# Patient Record
Sex: FEMALE | Race: WHITE | NOT HISPANIC OR LATINO | Employment: FULL TIME | URBAN - METROPOLITAN AREA
[De-identification: names, ages, dates, MRNs, and addresses within clinical notes are randomized per-mention and may not be internally consistent; named-entity substitution may affect disease eponyms.]

---
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
## 2021-01-27 ENCOUNTER — AMBULATORY - HEALTHEAST (OUTPATIENT)
Dept: NURSING | Facility: CLINIC | Age: 21
End: 2021-01-27

## 2021-02-07 ENCOUNTER — HEALTH MAINTENANCE LETTER (OUTPATIENT)
Age: 21
End: 2021-02-07

## 2021-02-17 ENCOUNTER — AMBULATORY - HEALTHEAST (OUTPATIENT)
Dept: NURSING | Facility: CLINIC | Age: 21
End: 2021-02-17

## 2021-04-11 ENCOUNTER — HOSPITAL ENCOUNTER (EMERGENCY)
Facility: CLINIC | Age: 21
Discharge: HOME | End: 2021-04-11
Attending: NURSE PRACTITIONER | Admitting: NURSE PRACTITIONER
Payer: COMMERCIAL

## 2021-04-11 ENCOUNTER — APPOINTMENT (OUTPATIENT)
Dept: ULTRASOUND IMAGING | Facility: CLINIC | Age: 21
End: 2021-04-11
Attending: NURSE PRACTITIONER
Payer: COMMERCIAL

## 2021-04-11 VITALS
SYSTOLIC BLOOD PRESSURE: 118 MMHG | HEART RATE: 91 BPM | OXYGEN SATURATION: 100 % | RESPIRATION RATE: 18 BRPM | WEIGHT: 115 LBS | BODY MASS INDEX: 20.38 KG/M2 | DIASTOLIC BLOOD PRESSURE: 76 MMHG | TEMPERATURE: 97.9 F | HEIGHT: 63 IN

## 2021-04-11 DIAGNOSIS — R78.81 BACTEREMIA: ICD-10-CM

## 2021-04-11 DIAGNOSIS — R10.2 PELVIC PAIN IN FEMALE: ICD-10-CM

## 2021-04-11 DIAGNOSIS — R35.0 URINARY FREQUENCY: ICD-10-CM

## 2021-04-11 LAB
ALBUMIN UR-MCNC: NEGATIVE MG/DL
AMORPH CRY #/AREA URNS HPF: ABNORMAL /HPF
ANION GAP SERPL CALCULATED.3IONS-SCNC: 5 MMOL/L (ref 3–14)
APPEARANCE UR: ABNORMAL
B-HCG SERPL-ACNC: ABNORMAL IU/L (ref 0–5)
BACTERIA #/AREA URNS HPF: ABNORMAL /HPF
BASOPHILS # BLD AUTO: 0.1 10E9/L (ref 0–0.2)
BASOPHILS NFR BLD AUTO: 0.5 %
BILIRUB UR QL STRIP: NEGATIVE
BUN SERPL-MCNC: 9 MG/DL (ref 7–30)
CALCIUM SERPL-MCNC: 8.8 MG/DL (ref 8.5–10.1)
CHLORIDE SERPL-SCNC: 104 MMOL/L (ref 94–109)
CO2 SERPL-SCNC: 27 MMOL/L (ref 20–32)
COLOR UR AUTO: YELLOW
CREAT SERPL-MCNC: 0.74 MG/DL (ref 0.52–1.04)
DIFFERENTIAL METHOD BLD: ABNORMAL
EOSINOPHIL # BLD AUTO: 0.2 10E9/L (ref 0–0.7)
EOSINOPHIL NFR BLD AUTO: 1.4 %
ERYTHROCYTE [DISTWIDTH] IN BLOOD BY AUTOMATED COUNT: 13.1 % (ref 10–15)
GFR SERPL CREATININE-BSD FRML MDRD: >90 ML/MIN/{1.73_M2}
GLUCOSE SERPL-MCNC: 98 MG/DL (ref 70–99)
GLUCOSE UR STRIP-MCNC: NEGATIVE MG/DL
HCT VFR BLD AUTO: 39.8 % (ref 35–47)
HGB BLD-MCNC: 13.5 G/DL (ref 11.7–15.7)
HGB UR QL STRIP: NEGATIVE
IMM GRANULOCYTES # BLD: 0 10E9/L (ref 0–0.4)
IMM GRANULOCYTES NFR BLD: 0.2 %
KETONES UR STRIP-MCNC: NEGATIVE MG/DL
LEUKOCYTE ESTERASE UR QL STRIP: ABNORMAL
LYMPHOCYTES # BLD AUTO: 2.8 10E9/L (ref 0.8–5.3)
LYMPHOCYTES NFR BLD AUTO: 21.1 %
MCH RBC QN AUTO: 29.7 PG (ref 26.5–33)
MCHC RBC AUTO-ENTMCNC: 33.9 G/DL (ref 31.5–36.5)
MCV RBC AUTO: 88 FL (ref 78–100)
MONOCYTES # BLD AUTO: 0.9 10E9/L (ref 0–1.3)
MONOCYTES NFR BLD AUTO: 6.6 %
NEUTROPHILS # BLD AUTO: 9.3 10E9/L (ref 1.6–8.3)
NEUTROPHILS NFR BLD AUTO: 70.2 %
NITRATE UR QL: NEGATIVE
NRBC # BLD AUTO: 0 10*3/UL
NRBC BLD AUTO-RTO: 0 /100
PH UR STRIP: 8 PH (ref 5–7)
PLATELET # BLD AUTO: 239 10E9/L (ref 150–450)
POTASSIUM SERPL-SCNC: 3.4 MMOL/L (ref 3.4–5.3)
RBC # BLD AUTO: 4.54 10E12/L (ref 3.8–5.2)
RBC #/AREA URNS AUTO: <1 /HPF (ref 0–2)
SODIUM SERPL-SCNC: 136 MMOL/L (ref 133–144)
SOURCE: ABNORMAL
SP GR UR STRIP: 1.01 (ref 1–1.03)
SQUAMOUS #/AREA URNS AUTO: 1 /HPF (ref 0–1)
UROBILINOGEN UR STRIP-MCNC: 0 MG/DL (ref 0–2)
WBC # BLD AUTO: 13.2 10E9/L (ref 4–11)
WBC #/AREA URNS AUTO: 8 /HPF (ref 0–5)

## 2021-04-11 PROCEDURE — 85025 COMPLETE CBC W/AUTO DIFF WBC: CPT | Performed by: NURSE PRACTITIONER

## 2021-04-11 PROCEDURE — 80048 BASIC METABOLIC PNL TOTAL CA: CPT | Performed by: NURSE PRACTITIONER

## 2021-04-11 PROCEDURE — 76801 OB US < 14 WKS SINGLE FETUS: CPT

## 2021-04-11 PROCEDURE — 87086 URINE CULTURE/COLONY COUNT: CPT | Performed by: NURSE PRACTITIONER

## 2021-04-11 PROCEDURE — 84702 CHORIONIC GONADOTROPIN TEST: CPT | Performed by: NURSE PRACTITIONER

## 2021-04-11 PROCEDURE — 99284 EMERGENCY DEPT VISIT MOD MDM: CPT | Performed by: NURSE PRACTITIONER

## 2021-04-11 PROCEDURE — 99284 EMERGENCY DEPT VISIT MOD MDM: CPT | Mod: 25 | Performed by: NURSE PRACTITIONER

## 2021-04-11 PROCEDURE — 81001 URINALYSIS AUTO W/SCOPE: CPT | Performed by: NURSE PRACTITIONER

## 2021-04-11 ASSESSMENT — ENCOUNTER SYMPTOMS
NUMBNESS: 0
NAUSEA: 0
FATIGUE: 0
ARTHRALGIAS: 0
LIGHT-HEADEDNESS: 0
CHILLS: 0
ABDOMINAL PAIN: 0
VOMITING: 0
FLANK PAIN: 0
JOINT SWELLING: 0
COUGH: 0
DYSURIA: 0
HEMATURIA: 0
SHORTNESS OF BREATH: 0
DIZZINESS: 0
MYALGIAS: 0
HEADACHES: 0
WEAKNESS: 0
FREQUENCY: 1
DIFFICULTY URINATING: 0
FEVER: 0

## 2021-04-11 ASSESSMENT — MIFFLIN-ST. JEOR: SCORE: 1255.77

## 2021-04-12 NOTE — ED PROVIDER NOTES
History     Chief Complaint   Patient presents with     Pelvic Pain     pelvic pain, onset last evening. aching, stabbing. 8/10. pain constant. unsure of how many weeks pregant, 1st pregnancy. no vaginal discharge or drainage.      RAGHAV Mckeon is a 20 year old female who presents to the emergency department for evaluation of pelvic pain. Patient timeline includes right lower pelvic pain for two days on 3/21. Positive pregnancy test prior to a procedure on 3/30. Yesterday patient developed a sharp, pinching and aching pain to the right pelvis that worsened today. Accompanying urinary frequency.  LMP 3/8/21. Normal BM today. No OB/GYN visit yet. No fever, abdominal pain, nausea, vomiting, diarrhea, vaginal bleeding, change in vaginal discharge, hematuria and dysuria.     Allergies:  Allergies   Allergen Reactions     Keflex [Cephalexin]        Problem List:    There are no active problems to display for this patient.       Past Medical History:    Past Medical History:   Diagnosis Date     Anxiety        Past Surgical History:    No past surgical history on file.    Family History:    No family history on file.    Social History:  Marital Status:  Single [1]  Social History     Tobacco Use     Smoking status: Not on file   Substance Use Topics     Alcohol use: No     Drug use: No        Medications:    Escitalopram Oxalate (LEXAPRO PO)  NONFORMULARY          Review of Systems   Constitutional: Negative for chills, fatigue and fever.   Respiratory: Negative for cough and shortness of breath.    Cardiovascular: Negative for chest pain.   Gastrointestinal: Negative for abdominal pain, nausea and vomiting.   Genitourinary: Positive for frequency and pelvic pain. Negative for difficulty urinating, dysuria, flank pain, hematuria, vaginal bleeding, vaginal discharge and vaginal pain.   Musculoskeletal: Negative for arthralgias, joint swelling and myalgias.   Skin: Negative for rash.   Neurological: Negative  "for dizziness, weakness, light-headedness, numbness and headaches.   All other systems reviewed and are negative.      Physical Exam   BP: 118/76  Pulse: 91  Temp: 97.9  F (36.6  C)  Resp: 18  Height: 160 cm (5' 3\")  Weight: 52.2 kg (115 lb)  SpO2: 100 %      Physical Exam  Constitutional:       General: She is not in acute distress.     Appearance: She is well-developed. She is not diaphoretic.   HENT:      Head: Normocephalic.   Eyes:      Conjunctiva/sclera: Conjunctivae normal.      Pupils: Pupils are equal, round, and reactive to light.   Neck:      Musculoskeletal: Normal range of motion and neck supple.   Cardiovascular:      Rate and Rhythm: Normal rate and regular rhythm.      Pulses: Normal pulses.   Pulmonary:      Effort: Pulmonary effort is normal. No respiratory distress.      Breath sounds: Normal breath sounds and air entry. No decreased air movement. No decreased breath sounds, wheezing or rhonchi.   Abdominal:      General: There is no distension.      Palpations: Abdomen is soft.      Tenderness: There is abdominal tenderness in the suprapubic area. There is no right CVA tenderness, left CVA tenderness, guarding or rebound. Negative signs include Slater's sign and McBurney's sign.   Musculoskeletal: Normal range of motion.   Skin:     General: Skin is warm.      Capillary Refill: Capillary refill takes less than 2 seconds.   Neurological:      General: No focal deficit present.      Mental Status: She is alert and oriented to person, place, and time.   Psychiatric:         Mood and Affect: Mood normal.       ED Course        Procedures        Results for orders placed or performed during the hospital encounter of 04/11/21 (from the past 24 hour(s))   UA with Microscopic reflex to Culture    Specimen: Midstream Urine   Result Value Ref Range    Color Urine Yellow     Appearance Urine Slightly Cloudy     Glucose Urine Negative NEG^Negative mg/dL    Bilirubin Urine Negative NEG^Negative    Ketones " Urine Negative NEG^Negative mg/dL    Specific Gravity Urine 1.010 1.003 - 1.035    Blood Urine Negative NEG^Negative    pH Urine 8.0 (H) 5.0 - 7.0 pH    Protein Albumin Urine Negative NEG^Negative mg/dL    Urobilinogen mg/dL 0.0 0.0 - 2.0 mg/dL    Nitrite Urine Negative NEG^Negative    Leukocyte Esterase Urine Moderate (A) NEG^Negative    Source Midstream Urine     WBC Urine 8 (H) 0 - 5 /HPF    RBC Urine <1 0 - 2 /HPF    Bacteria Urine Few (A) NEG^Negative /HPF    Squamous Epithelial /HPF Urine 1 0 - 1 /HPF    Amorphous Crystals Few (A) NEG^Negative /HPF   HCG quantitative pregnancy (blood)   Result Value Ref Range    HCG Quantitative Serum 48,439 (H) 0 - 5 IU/L   CBC with platelets, differential   Result Value Ref Range    WBC 13.2 (H) 4.0 - 11.0 10e9/L    RBC Count 4.54 3.8 - 5.2 10e12/L    Hemoglobin 13.5 11.7 - 15.7 g/dL    Hematocrit 39.8 35.0 - 47.0 %    MCV 88 78 - 100 fl    MCH 29.7 26.5 - 33.0 pg    MCHC 33.9 31.5 - 36.5 g/dL    RDW 13.1 10.0 - 15.0 %    Platelet Count 239 150 - 450 10e9/L    Diff Method Automated Method     % Neutrophils 70.2 %    % Lymphocytes 21.1 %    % Monocytes 6.6 %    % Eosinophils 1.4 %    % Basophils 0.5 %    % Immature Granulocytes 0.2 %    Nucleated RBCs 0 0 /100    Absolute Neutrophil 9.3 (H) 1.6 - 8.3 10e9/L    Absolute Lymphocytes 2.8 0.8 - 5.3 10e9/L    Absolute Monocytes 0.9 0.0 - 1.3 10e9/L    Absolute Eosinophils 0.2 0.0 - 0.7 10e9/L    Absolute Basophils 0.1 0.0 - 0.2 10e9/L    Abs Immature Granulocytes 0.0 0 - 0.4 10e9/L    Absolute Nucleated RBC 0.0    Basic metabolic panel   Result Value Ref Range    Sodium 136 133 - 144 mmol/L    Potassium 3.4 3.4 - 5.3 mmol/L    Chloride 104 94 - 109 mmol/L    Carbon Dioxide 27 20 - 32 mmol/L    Anion Gap 5 3 - 14 mmol/L    Glucose 98 70 - 99 mg/dL    Urea Nitrogen 9 7 - 30 mg/dL    Creatinine 0.74 0.52 - 1.04 mg/dL    GFR Estimate >90 >60 mL/min/[1.73_m2]    GFR Estimate If Black >90 >60 mL/min/[1.73_m2]    Calcium 8.8 8.5 - 10.1  mg/dL   OB < 14 weeks single or first gestation US    Narrative    EXAM: US OB < 14 WEEKS SINGLE  LOCATION: [facility]  DATE/TIME: 4/11/2021 9:44 PM    INDICATION: Pregnant with cramping.  COMPARISON: None.  TECHNIQUE: Transabdominal scans were performed.    FINDINGS:  UTERUS: Single normal appearing intrauterine gestation sac.  CRL: Measures 0.6 cm, equals 6 weeks 3 days.  FETAL HEART RATE: 133 bpm.  AMNIOTIC FLUID: Normal.  PLACENTA: Not yet formed. No evidence for sub-chorionic hemorrhage.    RIGHT OVARY: 2.1 cm corpus luteum cyst.  LEFT OVARY: Normal.      Impression    IMPRESSION:   1.  Single living intrauterine gestation at 6 weeks 3 days, EDC 12/2/2021.  2.  No cause of cramping is evident.           Medications - No data to display    Assessments & Plan (with Medical Decision Making)   Paula Mckeon is a 20 year old female who presents to the emergency department for evaluation of pelvic pain. Patient timeline includes right lower pelvic pain for two days on 3/21. Positive pregnancy test prior to a procedure on 3/30. Yesterday patient developed a sharp, pinching and aching pain to the right pelvis that worsened today. Accompanying urinary frequency. Vital signs normal. Physical exam as above. BMP normal. WBC 13.2, ANC 9.3. Urinalysis with moderate LE, 8 WBC and few bacteria. Discussed results with patient, she would like to await culture results prior to treatment. Urine culture pending. Current Hcg quant 48,439. US with IUP of 6 weeks, 3 days gestation and no evidence of sub-chorionic hemorrhage. Updated patient on reassuring findings. Stressed importance of prenatal vitamins and establishing care with OB/GYN. Return to the department with new or worsening symptoms. She is agreeable to plan of care and comfortable with discharge. Discharged in good condition.     I have reviewed the nursing notes.    I have reviewed the findings, diagnosis, plan and need for follow up with the  patient.  Discharge Medication List as of 4/11/2021 11:01 PM        Final diagnoses:   Bacteremia   Urinary frequency   Pelvic pain in female     4/11/2021   [facility] EMERGENCY DEPT     Alisa Morton, APRN CNP  04/11/21 7009

## 2021-04-12 NOTE — DISCHARGE INSTRUCTIONS
Call OB/GYN to establish care tomorrow. Your estimated due date is 12/02/21. Begin prenatal vitamin with [facility] tomorrow.

## 2021-04-12 NOTE — RESULT ENCOUNTER NOTE
[facility] Emergency Dept discharge antibiotic (if prescribed): None  No changes in treatment per [facility] ED Lab Result Urine culture protocol.

## 2021-04-13 LAB
BACTERIA SPEC CULT: NORMAL
Lab: NORMAL
SPECIMEN SOURCE: NORMAL

## 2021-04-13 NOTE — RESULT ENCOUNTER NOTE
Final urine culture report is negative.  Adult Negative Urine culture parameters: Any # Urogenital single or mixed organism, <10,000 col/ml single organism (cath specimen), and <50,000 col/ml single organism (midstream or cath specimen).  Treatment recommendations per [facility] ED Lab Result Urine Culture protocol.

## 2021-09-18 ENCOUNTER — HEALTH MAINTENANCE LETTER (OUTPATIENT)
Age: 21
End: 2021-09-18

## 2022-01-23 ENCOUNTER — OFFICE VISIT (OUTPATIENT)
Dept: URGENT CARE | Facility: URGENT CARE | Age: 22
End: 2022-01-23
Payer: COMMERCIAL

## 2022-01-23 VITALS
HEART RATE: 107 BPM | WEIGHT: 111 LBS | BODY MASS INDEX: 19.66 KG/M2 | SYSTOLIC BLOOD PRESSURE: 128 MMHG | DIASTOLIC BLOOD PRESSURE: 80 MMHG | RESPIRATION RATE: 16 BRPM | OXYGEN SATURATION: 100 % | TEMPERATURE: 99.3 F

## 2022-01-23 DIAGNOSIS — J02.8 ACUTE PHARYNGITIS DUE TO OTHER SPECIFIED ORGANISMS: ICD-10-CM

## 2022-01-23 DIAGNOSIS — R07.0 THROAT PAIN: Primary | ICD-10-CM

## 2022-01-23 DIAGNOSIS — R09.81 NASAL CONGESTION: ICD-10-CM

## 2022-01-23 LAB
DEPRECATED S PYO AG THROAT QL EIA: NEGATIVE
GROUP A STREP BY PCR: NOT DETECTED

## 2022-01-23 PROCEDURE — 87651 STREP A DNA AMP PROBE: CPT | Performed by: FAMILY MEDICINE

## 2022-01-23 PROCEDURE — 99000 SPECIMEN HANDLING OFFICE-LAB: CPT | Performed by: FAMILY MEDICINE

## 2022-01-23 PROCEDURE — 99203 OFFICE O/P NEW LOW 30 MIN: CPT | Performed by: FAMILY MEDICINE

## 2022-01-23 PROCEDURE — U0003 INFECTIOUS AGENT DETECTION BY NUCLEIC ACID (DNA OR RNA); SEVERE ACUTE RESPIRATORY SYNDROME CORONAVIRUS 2 (SARS-COV-2) (CORONAVIRUS DISEASE [COVID-19]), AMPLIFIED PROBE TECHNIQUE, MAKING USE OF HIGH THROUGHPUT TECHNOLOGIES AS DESCRIBED BY CMS-2020-01-R: HCPCS | Mod: 90 | Performed by: FAMILY MEDICINE

## 2022-01-23 PROCEDURE — U0005 INFEC AGEN DETEC AMPLI PROBE: HCPCS | Mod: 90 | Performed by: FAMILY MEDICINE

## 2022-01-23 RX ORDER — DEXAMETHASONE 4 MG/1
8 TABLET ORAL
Qty: 6 TABLET | Refills: 0 | Status: SHIPPED | OUTPATIENT
Start: 2022-01-23 | End: 2022-01-26

## 2022-01-23 NOTE — PROGRESS NOTES
SUBJECTIVE:   Paula Mckeon is a 21 year old female presenting with a chief complaint of   Chief Complaint   Patient presents with     Pharyngitis     1/21/22 sore throat white patches     Sinus Problem     sinus pressure   .    Onset of symptoms was 2 day(s) ago.  Course of illness is waxing and waning.    Severity moderate  Current and Associated symptoms: stuffy nose, ear pain bilateral mild, sore throat and fatigue  Treatment measures tried include Tylenol/Ibuprofen, Fluids and Rest.  Predisposing factors include .      ROS      Past Medical History:   Diagnosis Date     Anxiety      Current Outpatient Medications   Medication Sig Dispense Refill     NONFORMULARY Birth control       Escitalopram Oxalate (LEXAPRO PO)  (Patient not taking: Reported on 1/23/2022)       Social History     Tobacco Use     Smoking status: Never Smoker     Smokeless tobacco: Never Used   Substance Use Topics     Alcohol use: No       OBJECTIVE  /80   Pulse 107   Temp 99.3  F (37.4  C) (Tympanic)   Resp 16   Wt 50.3 kg (111 lb)   LMP 03/08/2021   SpO2 100%   BMI 19.66 kg/m      Physical Exam    Labs:  No results found for this or any previous visit (from the past 24 hour(s)).    X-Ray was not done.    ASSESSMENT:    1. Throat pain    - Streptococcus A Rapid Screen w/Reflex to PCR - Clinic Collect  - Group A Streptococcus PCR Throat Swab    2. Nasal congestion    - Symptomatic; Yes; 1/21/2022 COVID-19 Virus (Coronavirus) by PCR Nose    3. Acute pharyngitis due to other specified organisms  If your strep is positive do not take the steroid  - dexamethasone (DECADRON) 4 MG tablet; Take 2 tablets (8 mg) by mouth daily (with breakfast) for 3 days  Dispense: 6 tablet; Refill: 0       Medical Decision Making:        Serious Comorbid Conditions:  None    PLAN:  Tylenol    Followup:    In 1 week(s) follow up with If not improving or if condition worsens, follow up with your Primary Care Provider    Rosaura Farrar M.D.

## 2022-01-24 LAB — SARS-COV-2 RNA RESP QL NAA+PROBE: DETECTED

## 2022-01-25 ENCOUNTER — VIRTUAL VISIT (OUTPATIENT)
Dept: FAMILY MEDICINE | Facility: CLINIC | Age: 22
End: 2022-01-25
Payer: COMMERCIAL

## 2022-01-25 ENCOUNTER — TELEPHONE (OUTPATIENT)
Dept: URGENT CARE | Facility: URGENT CARE | Age: 22
End: 2022-01-25

## 2022-01-25 DIAGNOSIS — U07.1 INFECTION DUE TO 2019 NOVEL CORONAVIRUS: Primary | ICD-10-CM

## 2022-01-25 PROCEDURE — 99213 OFFICE O/P EST LOW 20 MIN: CPT | Mod: GT | Performed by: PHYSICIAN ASSISTANT

## 2022-01-25 RX ORDER — CODEINE PHOSPHATE AND GUAIFENESIN 10; 100 MG/5ML; MG/5ML
1-2 SOLUTION ORAL EVERY 4 HOURS PRN
Qty: 118 ML | Refills: 0 | Status: SHIPPED | OUTPATIENT
Start: 2022-01-25

## 2022-01-25 RX ORDER — NORETHINDRONE ACETATE AND ETHINYL ESTRADIOL .02; 1 MG/1; MG/1
TABLET ORAL
COMMUNITY
Start: 2021-12-06

## 2022-01-25 RX ORDER — LEVALBUTEROL TARTRATE 45 UG/1
1-2 AEROSOL, METERED ORAL
COMMUNITY
Start: 2021-02-18 | End: 2022-01-25

## 2022-01-25 RX ORDER — LEVALBUTEROL TARTRATE 45 UG/1
1-2 AEROSOL, METERED ORAL EVERY 4 HOURS PRN
Qty: 15 G | Refills: 0 | Status: SHIPPED | OUTPATIENT
Start: 2022-01-25

## 2022-01-25 RX ORDER — LIDOCAINE HYDROCHLORIDE 20 MG/ML
15 SOLUTION OROPHARYNGEAL
COMMUNITY
Start: 2022-01-24

## 2022-01-25 RX ORDER — BENZONATATE 200 MG/1
200 CAPSULE ORAL 3 TIMES DAILY PRN
Qty: 30 CAPSULE | Refills: 0 | Status: SHIPPED | OUTPATIENT
Start: 2022-01-25

## 2022-01-25 NOTE — PROGRESS NOTES
Paula is a 21 year old who is being evaluated via a billable video visit.      How would you like to obtain your AVS? MyChart  If the video visit is dropped, the invitation should be resent by: Other e-mail: my chart   Will anyone else be joining your video visit? No    Video Start Time: 2:30 PM    Assessment & Plan   Infection due to 2019 novel coronavirus  Covid positive. Symptoms are mild-moderate. No new fevers, worsening shortness of breathing, chest pain. No red flag signs or symptoms. Rx for Xopenex, Tessalon and Robitussin AC. RTC prn for any new, changing or worsening symptoms.    - levalbuterol (XOPENEX HFA) 45 MCG/ACT inhaler; Inhale 1-2 puffs into the lungs every 4 hours as needed for shortness of breath / dyspnea or wheezing  - benzonatate (TESSALON) 200 MG capsule; Take 1 capsule (200 mg) by mouth 3 times daily as needed for cough  - guaiFENesin-codeine (ROBITUSSIN AC) 100-10 MG/5ML solution; Take 5-10 mLs by mouth every 4 hours as needed for cough    Return in about 1 week (around 2/1/2022), or if symptoms worsen or fail to improve, for Video Visit.    Duran Rizo PA-C  [facility]   Paula is a 21 year old who presents for the following health issues     HPI   Covid   -Positive test 01/23/2022  -Was given magic mouth wash for her sore throat.   -Urgent care provider gave her and told her to start dexamethasone if strep was negative so she started that this morning.     Main Concerns   -What else she can do for treatment   -Shortness of breath. Says that she can not walk from her bedroom to her kitchen with out being short of breath. Says that even talking makes her short of breath.       Review of Systems   See HPI       Objective           Vitals:  No vitals were obtained today due to virtual visit.    Physical Exam   GENERAL: Healthy, alert and no distress  EYES: Eyes grossly normal to inspection.  No discharge or erythema, or obvious  scleral/conjunctival abnormalities.  RESP: No audible wheeze, cough, or visible cyanosis.  No visible retractions or increased work of breathing.    SKIN: Visible skin clear. No significant rash, abnormal pigmentation or lesions.  NEURO: Cranial nerves grossly intact.  Mentation and speech appropriate for age.  PSYCH: Mentation appears normal, affect normal/bright, judgement and insight intact, normal speech and appearance well-groomed.          Video-Visit Details    Type of service:  Video Visit    Video End Time:2:45 PM    Originating Location (pt. Location): Home    Distant Location (provider location):  [facility]     Platform used for Video Visit: [employer]

## 2022-01-25 NOTE — TELEPHONE ENCOUNTER
"Coronavirus (COVID-19) Notification    Very pleasant to speak with. Pt states she gets these asthma like flare ups when she comes down w/ sickness, has had it since she was 7. 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 Knows tripp will have notice for work if needed. SOB was addressed as not a concern at this time, she is currently checking her o2 stats at home ranging around 94%. \"Because I have knowledge in healthcare, I am trying what I can to prevent going into the ED.\" She did not have any further questions.     Caller Name (Patient, parent, daughter/son, grandparent, etc)  Pt    Reason for call  Notify of Positive Coronavirus (COVID-19) lab results, assess symptoms,  review [facility] recommendations    Lab Result    Lab test:  2019-nCoV rRt-PCR or SARS-CoV-2 PCR    Oropharyngeal AND/OR nasopharyngeal swabs is POSITIVE for 2019-nCoV RNA/SARS-COV-2 PCR (COVID-19 virus)    RN Recommendations/Instructions per [facility] Coronavirus COVID-19 recommendations    Brief introduction script  Introduce self then review script:  \"I am calling on behalf of [employer].  We were notified that your Coronavirus test (COVID-19) for was POSITIVE for the virus.  I have some information to relay to you but first I wanted to mention that the [place] Dept of Health will be contacting you shortly [it's possible [facility] already called Patient] to talk to you more about how you are feeling and other people you have had contact with who might now also have the virus.  Also,  [employer] is Partnering with the [facility] for Covid-19 research, you may be contacted directly by research staff.\"      Assessment (Inquire about Patient's current symptoms)   Assessment   Current Symptoms at time of phone call: (if no symptoms, document No symptoms] SOB during activies, sore throat, coughing, congestion, body aches, GI related sx   Symptoms onset (if " applicable) 01/21/22     If at time of call, Patients symptoms hare worsened, the Patient should contact 911 or have someone drive them to Emergency Dept promptly:      If Patient calling 911, inform 911 personal that you have tested positive for the Coronavirus (COVID-19).  Place mask on and await 911 to arrive.    If Emergency Dept, If possible, please have another adult drive you to the Emergency Dept but you need to wear mask when in contact with other people.          Treatment Options:   Patient classified as COVID treatment eligible by Epic high risk algorithm: Yes  Is the patient symptomatic at the time of result notification? Yes. Was the onset of symptoms within the last 5 days? Yes.   Inform patient they may be eligible for a therapeutic treatment option that may reduce complications and hospitalization risk related to COVID19. These options would be discussed during a virtual visit with a provider.   Does the patient agree to have a visit with a provider to discuss treatment options? Yes. Is the patient seen at [facility]?  No: Warm transfer caller to [phone] to be scheduled with a virtual urgent provider.  During transfer, instruct  on appropriate time frame for visit (within 5 days of symptoms onset). If patient is on day 5 and unable to be scheduled, scheduling team will provide [facility] website.     Review information with Patient    Your result was positive. This means you have COVID-19 (coronavirus).  We have sent you a letter that reviews the information that I'll be reviewing with you now.    How can I protect others?    If you have symptoms: stay home and away from others (self-isolate) until:    You've had no fever--and no medicine that reduces fever--for 1 full day (24 hours). And       Your other symptoms have gotten better. For example, your cough or breathing has improved. And     At least 10 days have passed since your symptoms started. (If you've been told by a  doctor that you have a weak immune system, wait 20 days.)     If you don't have symptoms: Stay home and away from others (self-isolate) until at least 10 days have passed since your first positive COVID-19 test. (Date test collected)    During this time:    Stay in your own room, including for meals. Use your own bathroom if you can.    Stay away from others in your home. No hugging, kissing or shaking hands. No visitors.     Don't go to work, school or anywhere else.     Clean  high touch  surfaces often (doorknobs, counters, handles, etc.). Use a household cleaning spray or wipes. You'll find a full list on the EPA website at www.epa.gov/pesticide-registration/list-n-disinfectants-use-against-sars-cov-2.     Cover your mouth and nose with a mask, tissue or other face covering to avoid spreading germs.    Wash your hands and face often with soap and water.    Make a list of people you have been in close contact with recently, even if either of you wore a face covering.   - Start your list from 2 days before you became ill or had a positive test.  - Include anyone that was within 6 feet of you for a cumulative total of 15 minutes or more in 24 hours. (Example: if you sat next to Jeff for 5 minutes in the morning and 10 minutes in the afternoon, then you were in close contact for 15 minutes total that day. Jeff would be added to your list.)    A public health worker will call or text you. It is important that you answer. They will ask you questions about possible exposures to COVID-19, such as people you have been in direct contact with and places you have visited.    Tell the people on your list that you have COVID-19; they should stay away from others for 14 days starting from the last time they were in contact with you (unless you are told something different from a public health worker).     Caregivers in these groups are at risk for severe illness due to COVID-19:  o People 65 years and older  o People who live  in a nursing home or long-term care facility  o People with chronic disease (lung, heart, cancer, diabetes, kidney, liver, immunologic)  o People who have a weakened immune system, including those who:  - Are in cancer treatment  - Take medicine that weakens the immune system, such as corticosteroids  - Had a bone marrow or organ transplant  - Have an immune deficiency  - Have poorly controlled HIV or AIDS  - Are obese (body mass index of 40 or higher)  - Smoke regularly    Caregivers should wear gloves while washing dishes, handling laundry and cleaning bedrooms and bathrooms.    Wash and dry laundry with special caution. Don't shake dirty laundry, and use the warmest water setting you can.    If you have a weakened immune system, ask your doctor about other actions you should take.    For more tips, go to www.cdc.gov/coronavirus/2019-ncov/downloads/10Things.pdf.    You should not go back to work until you meet the guidelines above for ending your home isolation. You don't need to be retested for COVID-19 before going back to work--studies show that you won't spread the virus if it's been at least 10 days since your symptoms started (or 20 days, if you have a weak immune system).    Employers: This document serves as formal notice of your employee's medical guidelines for going back to work. They must meet the above guidelines before going back to work in person.    How can I take care of myself?    1. Get lots of rest. Drink extra fluids (unless a doctor has told you not to).    2. Take Tylenol (acetaminophen) for fever or pain. If you have liver or kidney problems, ask your family doctor if it's okay to take Tylenol.     Take either:     650 mg (two 325 mg pills) every 4 to 6 hours, or     1,000 mg (two 500 mg pills) every 8 hours as needed.     Note: Don't take more than 3,000 mg in one day. Acetaminophen is found in many medicines (both prescribed and over-the-counter medicines). Read all labels to be sure you  don't take too much.    For children, check the Tylenol bottle for the right dose (based on their age or weight).    3. If you have other health problems (like cancer, heart failure, an organ transplant or severe kidney disease): Call your specialty clinic if you don't feel better in the next 2 days.    4. Know when to call 911: Emergency warning signs include:    Trouble breathing or shortness of breath    Pain or pressure in the chest that doesn't go away    Feeling confused like you haven't felt before, or not being able to wake up    Bluish-colored lips or face    5. Sign up for [employer]. We know it's scary to hear that you have COVID-19. We want to track your symptoms to make sure you're okay over the next 2 weeks. Please look for an email from [employer]--this is a free, online program that we'll use to keep in touch. To sign up, follow the link in the email. Learn more at [email or web address].    Where can I get more information?    [facility]: [email or web address]    Coronavirus Basics: www.health.[facility].[place]/diseases/coronavirus/basics.html    What to Do If You're Sick: www.cdc.gov/coronavirus/2019-ncov/about/steps-when-sick.html    Ending Home Isolation: www.cdc.gov/coronavirus/2019-ncov/hcp/disposition-in-home-patients.html     Caring for Someone with COVID-19: www.cdc.gov/coronavirus/2019-ncov/if-you-are-sick/care-for-someone.html     [facility] clinical trials (COVID-19 research studies): [email or web address]     A Positive COVID-19 letter will be sent via [employer] or the mail. (Exception, no letters sent to Presurgerical/Preprocedure Patients)    Amanda Smallwood

## 2022-02-01 NOTE — PATIENT INSTRUCTIONS
Patient Education       Coronavirus Disease 2019 (COVID-19): Caring for Yourself or Others  If you or a household member test positive for COVID-19 or have symptoms like fever, cough, fatigue, loss of taste or smell, follow these guidelines for preventing spread of the virus and managing symptoms. This is regardless of your vaccination status.  If you have COVID-19 symptoms    Stay home. Call your healthcare provider and tell them you have COVID-19 symptoms or you have tested positive for COVID-19. Do this before going to any hospital or clinic. Follow your provider's instructions. You will be advised to isolate yourself at home. This is called self-isolation. Isolate even if you don't have COVID-19 symptoms but you test positive. See the CDC's website for current, detailed information about staying home. Also, follow your local area's instructions on testing and staying home.    Stay away from work, school, and public places. Limit physical contact with family members. Limit visitors. Don't kiss anyone or share eating or drinking utensils. Clean surfaces you touch with disinfectant. This is to help prevent the virus from spreading.    If you need to cough or sneeze, do it into a tissue. Then throw the tissue into the trash. If you don't have tissues, cough or sneeze into the bend of your elbow.    Wear a cloth face mask with 2 or more layers of washable, breathable fabric and a nose wire while in public or when indoors with people who don't live with you. Or you can wear a disposable paper mask with a cloth mask on top. Wear the mask so that it covers both your nose, mouth, and chin. Make sure the mask is snug around your nose and the sides of your face.    Don t share food or personal items with people in your household. This includes items like eating and drinking utensils, towels, and bedding.    If you need to go to a hospital or clinic, expect that the healthcare staff will wear protective equipment such as  masks, gowns, gloves, and eye protection. You may be advised to wait in or enter through a separate area. This is to prevent the possible virus from spreading.    Tell the healthcare staff about recent travel. This includes local travel on public transport. Staff may need to find other people you have been in contact with.    Follow all instructions the healthcare staff give you.    If you have been diagnosed with COVID-19    Stay home and isolate. Don t leave your home unless you need to get medical care. Don't go to work, school, or public areas. Don't use public transportation or taxis.    Follow all instructions from your healthcare provider. Call your healthcare provider s office before going. They can prepare and give you instructions. This will help prevent the virus from spreading.    If you need to go to a hospital or clinic, expect that the healthcare staff will wear protective equipment such as masks, gowns, gloves, and eye protection. You may be advised to wait in or enter through a separate area. This is to prevent the possible virus from spreading.    Wear a well-fitted face mask with 2 or more layers and a nose wire. Use either a cloth mask with layers of tightly woven, breathable fabric or a disposable paper mask with a cloth mask on top. This is to protect other people from your germs. If you are not able to wear a mask, your caregivers should. Make sure the mask is snug around your nose and the sides of your face.    Stay away from other people in your home. Stay in a separate room with its own bathroom, if possible.    Stay away from pets and other animals.    Don t share food or personal items with people in your household. This includes items like eating and drinking utensils, towels, and bedding.    If you need to cough or sneeze, do it into a tissue. Then throw the tissue into the trash. If you don't have tissues, cough or sneeze into the bend of your elbow.    Wash your hands  often.    Self-care at home   Prevention  Several vaccines and booster shots are available to prevent COVID-19 or reduce its severity. These vaccines reduce how severe the illness will be if you get the virus. No vaccine is ever 100% effective in preventing any illness, but the COVID-19 vaccines work well and are safe. One vaccine is available for people as young as 5. Booster shots are available for people as young as 12. Expert groups, including ACOG and CDC, advise pregnant or breastfeeding people to be vaccinated. Talk with your healthcare provider about which vaccine is best for you and your family and when you may need a booster shot.  Treatment  Current treatment is mainly aimed at helping your body while it fights the virus. This is known as supportive care. If you have confirmed COVID-19, talk with your healthcare provider. You may qualify for certain medicines approved by the FDA to prevent severe COVID-19 infection.  For serious COVID-19, you may need to stay in the hospital. Supportive care includes:    Getting rest. This helps your body fight the illness.    Staying hydrated.  Drinking liquids is the best way to prevent dehydration. Try to drink 6 to 8 glasses of liquids every day, or as advised by your provider. Also check with your provider about which fluids are best for you. Don't drink fluids that contain caffeine or alcohol.    Taking over-the-counter (OTC) pain medicine. These are used to help ease pain and reduce fever. Follow your healthcare provider's instructions for which OTC medicine to use.  If you've been treated for suspected or confirmed COVID-19, follow all of your healthcare team's instructions. This will include when it's OK to stop self-isolation. You may also get instructions on position changes to help your breathing, such as lying on your belly (prone positioning). If you were treated at a hospital and discharged, you may be sent home with a pulse oximeter. This is a small  electronic device that you clip on your fingertip. It measures the amount of oxygen in your body. Follow your healthcare team's instructions on its use, how they will be in touch with you, and let you know when to call them.  The FDA approved monoclonal antibody therapy for emergency investigational use in certain people who have a positive COVID-19 viral test and have mild to moderate symptoms but are not in the hospital. Your healthcare provider will advise you on whether monoclonal antibody therapy is appropriate for you. It's not approved to prevent COVID-19. It's approved for people 12 years and older who weigh at least 88 pounds (about 40 kgs) and are at high risk for severe COVID-19 and a hospital stay. This includes people who are 65 years and older and people with certain chronic conditions. Monoclonal antibody therapy is not approved for people who:    Are in the hospital with COVID-19, or    Need oxygen therapy for COVID-19, or    Need oxygen therapy for a chronic condition and need to have oxygen flow increased because of COVID-19  If you've had confirmed COVID-19, your healthcare team may ask you to consider donating your plasma. This is called COVID-19 convalescent plasma donation. Plasma from people fully recovered from COVID-19 may contain antibodies to help fight COVID-19 in people who are currently seriously ill with the disease. Experts don't know the safety of COVID-19 convalescent plasma or how well it works. Research continues. The FDA has approved it for emergency use in certain people with serious or life-threatening COVID-19.  Home care for a sick person     Follow all instructions from healthcare staff.    Wash your hands often.    Wear protective clothing as advised.    Make sure the sick person wears a mask. If they can't wear a mask, don't stay in the same room with the person. If you must be in the same room, wear a face mask. When wearing a mask, make sure that it covers both the nose  and mouth.    Keep track of the sick person s symptoms.    Clean home surfaces often with disinfectant. This includes phones, kitchen counters, fridge door handle, bathroom surfaces, and others.    Don t let anyone share household items with the sick person. This includes eating and drinking tools, towels, sheets, or blankets.    Clean fabrics and laundry thoroughly.    Keep other people and pets away from the sick person.    When you can stop isolation  When you have COVID-19, with or without symptoms, you should stay away from other people. This is called isolation.  For normally healthy children or adults with COVID-19 symptoms, CDC advises stopping isolation when all 3 of these are true:  1. You have had no fever for at least 24 hours. This means no fever without medicine that reduces fever, such as acetaminophen, for at least 24 hours.  2. If you have symptoms, your symptoms such as cough or trouble breathing have improved.  3. It has been at least 5 days since your first symptoms started.  For people who have COVID-19 but no symptoms, isolation can stop 5 days after the first positive test. To calculate your 5-day isolation period, day 0 is your first day of symptoms. Day 1 is the first full day after your symptoms developed. You can leave isolation after 5 full days.  People who have tested positive (even without symptoms) should wear a well-fitting mask 5 more days after leaving isolation. This is day 6 through day 10.  If you have a weak immune system and COVID-19, or if you've had severe COVID-19,  your instructions on when to stop isolation will be somewhat different. Some conditions and treatments can cause a weak immune system. These include cancer treatment, bone marrow or organ transplants, and conditions such as HIV or other immune system disorders. You may be advised to self-isolate up to 20 days after your symptoms first started. Your healthcare provider may want to retest you for COVID-19. Follow  your provider's instructions.  CDC mask guidance  Consider the CDC's guidance and your local community's instructions on face masks.    Wear a well-fitting mask that is snug around your nose, mouth, and chin.    Choose a mask with a nose wire. Bend the wire over your nose to fit close to your face. This prevents air from leaking out of the top of the mask.    Cloth masks may help prevent people who have COVID-19 from spreading the virus to others.    Cloth masks are most likely to reduce COVID-19 spread when masks are widely used by people who are out in the public.    Use 2 layers of material for your mask. Wear a cloth mask that has multiple layers of fabric or wear a disposable mask underneath a cloth mask.    Wear a mask inside your house if you live with someone who has symptoms of COVID-19 or has tested positive for COVID-19.    CDC advises all people older than 2 who are not fully vaccinated to wear a mask and stay 6 feet away from others while in public.    CDC advises people with a weak immune system, even if fully vaccinated, to continue wearing masks and to stay 6 feet away from others while in public.    In  through grade 12 classes, CDC advises indoor masking for all teachers, staff, students ages 2 and older, and visitors to schools, regardless of vaccination status.    Like other viruses, the virus that causes COVID-19 changes (mutates) all the time. This leads to variants that are easily spread, including the delta variant. To protect against variants, CDC advises all people over age 2, including those who are fully vaccinated, to wear a mask indoors in public settings if you are in an area of high numbers of COVID-19 cases. See the CDC's county data website for current transmission information in your area.  Certain people should not wear a face mask. This includes:    Children younger than 2 years old    Anyone with a health, developmental, or mental health condition that can be made  worse by wearing a mask    Anyone who is unconscious or unable to remove the face covering without help  See the CDC's mask guidance.  When to call your healthcare provider  Call your healthcare provider right away if a sick person has any of these:    Trouble breathing    Pain or pressure in chest  If a sick person has any of these, call 911:    Trouble breathing that gets worse    Pain or pressure in chest that gets worse    Blue tint to lips or face    Fast or irregular heartbeat    Confusion or trouble waking    Fainting or loss of consciousness    Coughing up blood  Going home from the hospital  If you were diagnosed with COVID-19 and were recently discharged from the hospital:    Follow the instructions above for self-care and isolation.    Follow the hospital healthcare team s specific instructions.    Ask questions if anything is unclear to you. Write down answers so you remember them.  Date last modified: 1/7/2022  Ronen last reviewed this educational content on 12/1/2021 2000-2022 [employer]. All rights reserved. This information is not intended as a substitute for professional medical care. Always follow your healthcare professional's instructions.

## 2022-03-05 ENCOUNTER — HEALTH MAINTENANCE LETTER (OUTPATIENT)
Age: 22
End: 2022-03-05

## 2022-03-25 ENCOUNTER — HOSPITAL ENCOUNTER (EMERGENCY)
Facility: CLINIC | Age: 22
Discharge: HOME | End: 2022-03-25
Attending: EMERGENCY MEDICINE | Admitting: EMERGENCY MEDICINE
Payer: COMMERCIAL

## 2022-03-25 VITALS
BODY MASS INDEX: 19.14 KG/M2 | OXYGEN SATURATION: 100 % | RESPIRATION RATE: 16 BRPM | DIASTOLIC BLOOD PRESSURE: 66 MMHG | SYSTOLIC BLOOD PRESSURE: 106 MMHG | HEART RATE: 90 BPM | TEMPERATURE: 98 F | WEIGHT: 108 LBS | HEIGHT: 63 IN

## 2022-03-25 DIAGNOSIS — R42 DIZZINESS: ICD-10-CM

## 2022-03-25 LAB
ALBUMIN UR-MCNC: NEGATIVE MG/DL
ANION GAP SERPL CALCULATED.3IONS-SCNC: 4 MMOL/L (ref 3–14)
APPEARANCE UR: CLEAR
BASOPHILS # BLD AUTO: 0.1 10E3/UL (ref 0–0.2)
BASOPHILS NFR BLD AUTO: 0 %
BILIRUB UR QL STRIP: NEGATIVE
BUN SERPL-MCNC: 11 MG/DL (ref 7–30)
CALCIUM SERPL-MCNC: 9.2 MG/DL (ref 8.5–10.1)
CHLORIDE BLD-SCNC: 109 MMOL/L (ref 94–109)
CO2 SERPL-SCNC: 26 MMOL/L (ref 20–32)
COLOR UR AUTO: NORMAL
CREAT SERPL-MCNC: 0.72 MG/DL (ref 0.52–1.04)
EOSINOPHIL # BLD AUTO: 0.1 10E3/UL (ref 0–0.7)
EOSINOPHIL NFR BLD AUTO: 1 %
ERYTHROCYTE [DISTWIDTH] IN BLOOD BY AUTOMATED COUNT: 13.5 % (ref 10–15)
GFR SERPL CREATININE-BSD FRML MDRD: >90 ML/MIN/1.73M2
GLUCOSE BLD-MCNC: 102 MG/DL (ref 70–99)
GLUCOSE UR STRIP-MCNC: NEGATIVE MG/DL
HCG UR QL: NEGATIVE
HCT VFR BLD AUTO: 39.6 % (ref 35–47)
HGB BLD-MCNC: 13.6 G/DL (ref 11.7–15.7)
HGB UR QL STRIP: NEGATIVE
HOLD SPECIMEN: NORMAL
IMM GRANULOCYTES # BLD: 0 10E3/UL
IMM GRANULOCYTES NFR BLD: 0 %
KETONES UR STRIP-MCNC: NEGATIVE MG/DL
LEUKOCYTE ESTERASE UR QL STRIP: NEGATIVE
LYMPHOCYTES # BLD AUTO: 1.6 10E3/UL (ref 0.8–5.3)
LYMPHOCYTES NFR BLD AUTO: 14 %
MCH RBC QN AUTO: 29.8 PG (ref 26.5–33)
MCHC RBC AUTO-ENTMCNC: 34.3 G/DL (ref 31.5–36.5)
MCV RBC AUTO: 87 FL (ref 78–100)
MONOCYTES # BLD AUTO: 0.6 10E3/UL (ref 0–1.3)
MONOCYTES NFR BLD AUTO: 5 %
NEUTROPHILS # BLD AUTO: 9.3 10E3/UL (ref 1.6–8.3)
NEUTROPHILS NFR BLD AUTO: 80 %
NITRATE UR QL: NEGATIVE
NRBC # BLD AUTO: 0 10E3/UL
NRBC BLD AUTO-RTO: 0 /100
PH UR STRIP: 7 [PH] (ref 5–7)
PLATELET # BLD AUTO: 257 10E3/UL (ref 150–450)
POTASSIUM BLD-SCNC: 4.2 MMOL/L (ref 3.4–5.3)
RBC # BLD AUTO: 4.57 10E6/UL (ref 3.8–5.2)
RBC URINE: 0 /HPF
SODIUM SERPL-SCNC: 139 MMOL/L (ref 133–144)
SP GR UR STRIP: 1 (ref 1–1.03)
SQUAMOUS EPITHELIAL: 1 /HPF
TROPONIN I SERPL HS-MCNC: <3 NG/L
TSH SERPL DL<=0.005 MIU/L-ACNC: 1.04 MU/L (ref 0.4–4)
UROBILINOGEN UR STRIP-MCNC: NORMAL MG/DL
WBC # BLD AUTO: 11.6 10E3/UL (ref 4–11)
WBC URINE: 2 /HPF

## 2022-03-25 PROCEDURE — 85025 COMPLETE CBC W/AUTO DIFF WBC: CPT | Performed by: EMERGENCY MEDICINE

## 2022-03-25 PROCEDURE — 82310 ASSAY OF CALCIUM: CPT | Performed by: EMERGENCY MEDICINE

## 2022-03-25 PROCEDURE — 96361 HYDRATE IV INFUSION ADD-ON: CPT | Performed by: EMERGENCY MEDICINE

## 2022-03-25 PROCEDURE — 36415 COLL VENOUS BLD VENIPUNCTURE: CPT | Performed by: EMERGENCY MEDICINE

## 2022-03-25 PROCEDURE — 93010 ELECTROCARDIOGRAM REPORT: CPT | Performed by: EMERGENCY MEDICINE

## 2022-03-25 PROCEDURE — 84484 ASSAY OF TROPONIN QUANT: CPT | Performed by: EMERGENCY MEDICINE

## 2022-03-25 PROCEDURE — 84443 ASSAY THYROID STIM HORMONE: CPT | Performed by: EMERGENCY MEDICINE

## 2022-03-25 PROCEDURE — 96360 HYDRATION IV INFUSION INIT: CPT | Performed by: EMERGENCY MEDICINE

## 2022-03-25 PROCEDURE — 81025 URINE PREGNANCY TEST: CPT | Performed by: EMERGENCY MEDICINE

## 2022-03-25 PROCEDURE — 258N000003 HC RX IP 258 OP 636: Performed by: EMERGENCY MEDICINE

## 2022-03-25 PROCEDURE — 99284 EMERGENCY DEPT VISIT MOD MDM: CPT | Mod: 25 | Performed by: EMERGENCY MEDICINE

## 2022-03-25 PROCEDURE — 81001 URINALYSIS AUTO W/SCOPE: CPT | Performed by: EMERGENCY MEDICINE

## 2022-03-25 PROCEDURE — 93005 ELECTROCARDIOGRAM TRACING: CPT | Performed by: EMERGENCY MEDICINE

## 2022-03-25 RX ORDER — MECLIZINE HCL 12.5 MG 12.5 MG/1
12.5 TABLET ORAL 4 TIMES DAILY PRN
Qty: 12 TABLET | Refills: 0 | Status: SHIPPED | OUTPATIENT
Start: 2022-03-25

## 2022-03-25 RX ADMIN — SODIUM CHLORIDE 500 ML: 9 INJECTION, SOLUTION INTRAVENOUS at 13:22

## 2022-03-25 NOTE — DISCHARGE INSTRUCTIONS
Return if symptoms worsen or new symptoms develop.  Drink plenty of fluids.  If increased dizziness vomiting altered mental status focal numbness weakness any extremity or other symptoms present please return for further evaluation and care.  Follow-up with primary care physician after wearing Holter monitor.  Drink plenty of fluids and take meclizine as directed.

## 2022-03-25 NOTE — ED PROVIDER NOTES
History     Chief Complaint   Patient presents with     Palpitations     reports feeling faint and like her heart is racing      HPI  Paula Mckeon is a 21 year old female with past medical history significant for anxiety who presents emergency department complaining of dizziness and palpitations.  She states this morning she was sitting and all of a sudden had feeling like she was going to faint.  She did some deep breathing to try to slow down and this somewhat caused her to improve but then she felt like her heart was racing.  She had several episodes where she felt lightheaded and was often associated with head movements and she does feel little lightheaded with some slight movement of the room.  Denies any recent illness has not had any head trauma denies any fevers or chills has not had headache or visual changes denies any neck pain has not any chest pain or shortness of breath she denies any abdominal pain or back pain.  She denies any focal numbness weakness any extremity bowel or bladder dysfunction.  Patient denies any drug or alcohol use and does not think she is pregnant.  She has no history of thyroid disorder but has that in her family.  She has not had chest pain with this.    Allergies:  Allergies   Allergen Reactions     Cortisone Shortness Of Breath     Following hip injection/pill form cortisone does fine with   Following hip injection       Keflex [Cephalexin]        Problem List:    There are no problems to display for this patient.       Past Medical History:    Past Medical History:   Diagnosis Date     Anxiety        Past Surgical History:    No past surgical history on file.    Family History:    No family history on file.    Social History:  Marital Status:  Single [1]  Social History     Tobacco Use     Smoking status: Never Smoker     Smokeless tobacco: Never Used   Substance Use Topics     Alcohol use: No     Drug use: No        Medications:    benzonatate (TESSALON) 200 MG  "capsule  dexamethasone (DECADRON) 4 MG tablet  Escitalopram Oxalate (LEXAPRO PO)  guaiFENesin-codeine (ROBITUSSIN AC) 100-10 MG/5ML solution  levalbuterol (XOPENEX HFA) 45 MCG/ACT inhaler  lidocaine, viscous, (XYLOCAINE) 2 % solution  norethindrone-ethinyl estradiol (MICROGESTIN 1/20) 1-20 MG-MCG tablet          Review of Systems  All systems reviewed and other than pertinent positives and negatives in HPI all other systems are negative.  Physical Exam   BP: 131/87  Pulse: 89  Temp: 98  F (36.7  C)  Resp: 18  Height: 160 cm (5' 3\")  Weight: 49 kg (108 lb)  SpO2: 95 %      Physical Exam  Vitals and nursing note reviewed.   Constitutional:       General: She is not in acute distress.     Appearance: Normal appearance. She is not ill-appearing or toxic-appearing.   HENT:      Head: Normocephalic and atraumatic.      Nose: Nose normal.   Eyes:      Extraocular Movements: Extraocular movements intact.      Conjunctiva/sclera: Conjunctivae normal.      Pupils: Pupils are equal, round, and reactive to light.   Cardiovascular:      Rate and Rhythm: Normal rate and regular rhythm.      Pulses: Normal pulses.      Heart sounds: Normal heart sounds. No murmur heard.  Pulmonary:      Effort: Pulmonary effort is normal.      Breath sounds: Normal breath sounds. No wheezing or rhonchi.   Abdominal:      General: Abdomen is flat. There is no distension.      Palpations: Abdomen is soft.      Tenderness: There is no abdominal tenderness. There is no right CVA tenderness or left CVA tenderness.   Musculoskeletal:         General: No swelling or tenderness. Normal range of motion.      Cervical back: Normal range of motion and neck supple.      Left lower leg: No edema.   Skin:     General: Skin is dry.      Findings: No bruising or erythema.   Neurological:      General: No focal deficit present.      Mental Status: She is alert and oriented to person, place, and time.      Cranial Nerves: No cranial nerve deficit.      Sensory: No " sensory deficit.      Motor: No weakness.      Coordination: Coordination normal.      Comments: Hallpike maneuver is equivocal.  She does have some dizziness with head movements but no nystagmus is noted   Psychiatric:         Mood and Affect: Mood normal.         ED Course                 Procedures              EKG Interpretation:      Interpreted by Jim Hines MD  Rhythm: normal sinus   Rate: normal  Axis: normal  Ectopy: none  Conduction: normal  ST Segments/ T Waves: No ST-T wave changes  Q Waves: none  Comparison to prior: No old EKG available    Clinical Impression: normal EKG          Critical Care time:  none               Results for orders placed or performed during the hospital encounter of 03/25/22 (from the past 24 hour(s))   [place] Draw    Narrative    The following orders were created for panel order [place] Draw.  Procedure                               Abnormality         Status                     ---------                               -----------         ------                     Extra Blue Top Tube[[id number]]                              In process                 Extra Red Top Tube[[id number]]                               In process                 Extra Green Top (Lithium...[[id number]]                      In process                 Extra Purple Top Tube[[id number]]                            In process                   Please view results for these tests on the individual orders.   CBC with platelets differential    Narrative    The following orders were created for panel order CBC with platelets differential.  Procedure                               Abnormality         Status                     ---------                               -----------         ------                     CBC with platelets and d...[[id number]]  Abnormal            Final result                 Please view results for these tests on the individual orders.   Basic metabolic panel   Result Value Ref Range     Sodium 139 133 - 144 mmol/L    Potassium 4.2 3.4 - 5.3 mmol/L    Chloride 109 94 - 109 mmol/L    Carbon Dioxide (CO2)      Anion Gap      Urea Nitrogen      Creatinine      Calcium      Glucose      GFR Estimate     CBC with platelets and differential   Result Value Ref Range    WBC Count 11.6 (H) 4.0 - 11.0 10e3/uL    RBC Count 4.57 3.80 - 5.20 10e6/uL    Hemoglobin 13.6 11.7 - 15.7 g/dL    Hematocrit 39.6 35.0 - 47.0 %    MCV 87 78 - 100 fL    MCH 29.8 26.5 - 33.0 pg    MCHC 34.3 31.5 - 36.5 g/dL    RDW 13.5 10.0 - 15.0 %    Platelet Count 257 150 - 450 10e3/uL    % Neutrophils 80 %    % Lymphocytes 14 %    % Monocytes 5 %    % Eosinophils 1 %    % Basophils 0 %    % Immature Granulocytes 0 %    NRBCs per 100 WBC 0 <1 /100    Absolute Neutrophils 9.3 (H) 1.6 - 8.3 10e3/uL    Absolute Lymphocytes 1.6 0.8 - 5.3 10e3/uL    Absolute Monocytes 0.6 0.0 - 1.3 10e3/uL    Absolute Eosinophils 0.1 0.0 - 0.7 10e3/uL    Absolute Basophils 0.1 0.0 - 0.2 10e3/uL    Absolute Immature Granulocytes 0.0 <=0.4 10e3/uL    Absolute NRBCs 0.0 10e3/uL   UA with Microscopic reflex to Culture    Specimen: Urine, Clean Catch   Result Value Ref Range    Color Urine Straw Colorless, Straw, Light Yellow, Yellow    Appearance Urine Clear Clear    Glucose Urine Negative Negative mg/dL    Bilirubin Urine Negative Negative    Ketones Urine Negative Negative mg/dL    Specific Gravity Urine 1.003 1.003 - 1.035    Blood Urine Negative Negative    pH Urine 7.0 5.0 - 7.0    Protein Albumin Urine Negative Negative mg/dL    Urobilinogen Urine Normal Normal, 2.0 mg/dL    Nitrite Urine Negative Negative    Leukocyte Esterase Urine Negative Negative    RBC Urine 0 <=2 /HPF    WBC Urine 2 <=5 /HPF    Squamous Epithelials Urine 1 <=1 /HPF    Narrative    Urine Culture not indicated   HCG qualitative urine   Result Value Ref Range    hCG Urine Qualitative Negative Negative       Medications - No data to display    Assessments & Plan (with Medical Decision  Making) records were reviewed.  Labs were obtained.  EKG revealed normal sinus without significant abnormality.  There is no old EKG for comparison.  Patient white count was 11.6 hemoglobin 13.6 platelet count was 257.  Basic metabolic panel without significant abnormality UA was unremarkable pregnancy test negative.  TSH was within normal limits and troponin was normal.  Orthostatics revealed a slight increase in pulse with standing and she felt a little lightheaded standing.  She is given IV fluids.  She was observed for some time and was able to drink fluids without difficulty.  She was monitored on a cardiac monitor no obvious arrhythmias present at this point she is feeling better and is able to move around without significant dizziness.  At this time feel patient can be safely discharged home and to try meclizine with her and have her wear a Holter monitor for a couple days to see if an arrhythmia was not causing the symptoms.  This could be a mild vertigo or have some anxiety component but at this time patient's labs EKG looked decent and I feel she can be discharged he feels comfortable going home at this time.  She will follow up with her primary care early next week for recheck she understands that if symptoms worsen or new symptoms develop she will return to the emergency department for further evaluation and care.     I have reviewed the nursing notes.    I have reviewed the findings, diagnosis, plan and need for follow up with the patient.       Discharge Medication List as of 3/25/2022  2:55 PM      START taking these medications    Details   meclizine (ANTIVERT) 12.5 MG tablet Take 1 tablet (12.5 mg) by mouth 4 times daily as needed for dizziness, Disp-12 tablet, R-0, Local Print             Final diagnoses:   Dizziness       3/25/2022   [facility] EMERGENCY DEPT     Flora, Jim Coelho MD  03/26/22 0612

## 2022-03-29 ENCOUNTER — HOSPITAL ENCOUNTER (OUTPATIENT)
Dept: CARDIOLOGY | Facility: CLINIC | Age: 22
Discharge: HOME | End: 2022-03-29
Attending: EMERGENCY MEDICINE | Admitting: EMERGENCY MEDICINE
Payer: COMMERCIAL

## 2022-03-29 DIAGNOSIS — R42 DIZZINESS: ICD-10-CM

## 2022-03-29 PROCEDURE — 93227 XTRNL ECG REC<48 HR R&I: CPT | Performed by: INTERNAL MEDICINE

## 2022-03-29 PROCEDURE — 93225 XTRNL ECG REC<48 HRS REC: CPT

## 2022-11-19 ENCOUNTER — HEALTH MAINTENANCE LETTER (OUTPATIENT)
Age: 22
End: 2022-11-19

## 2023-02-08 ENCOUNTER — LAB REQUISITION (OUTPATIENT)
Dept: LAB | Facility: CLINIC | Age: 23
End: 2023-02-08

## 2023-02-08 DIAGNOSIS — N92.6 IRREGULAR MENSTRUATION, UNSPECIFIED: ICD-10-CM

## 2023-02-08 PROCEDURE — 87491 CHLMYD TRACH DNA AMP PROBE: CPT | Performed by: OBSTETRICS & GYNECOLOGY

## 2023-02-09 LAB
C TRACH DNA SPEC QL PROBE+SIG AMP: NEGATIVE
N GONORRHOEA DNA SPEC QL NAA+PROBE: NEGATIVE

## 2023-05-25 ENCOUNTER — HOSPITAL ENCOUNTER (EMERGENCY)
Facility: CLINIC | Age: 23
Discharge: HOME | End: 2023-05-25
Attending: EMERGENCY MEDICINE | Admitting: EMERGENCY MEDICINE
Payer: COMMERCIAL

## 2023-05-25 ENCOUNTER — APPOINTMENT (OUTPATIENT)
Dept: GENERAL RADIOLOGY | Facility: CLINIC | Age: 23
End: 2023-05-25
Attending: EMERGENCY MEDICINE
Payer: COMMERCIAL

## 2023-05-25 VITALS
HEART RATE: 68 BPM | TEMPERATURE: 98.9 F | RESPIRATION RATE: 10 BRPM | DIASTOLIC BLOOD PRESSURE: 85 MMHG | OXYGEN SATURATION: 98 % | SYSTOLIC BLOOD PRESSURE: 119 MMHG

## 2023-05-25 DIAGNOSIS — M25.461 EFFUSION OF RIGHT KNEE: ICD-10-CM

## 2023-05-25 PROCEDURE — 20610 DRAIN/INJ JOINT/BURSA W/O US: CPT | Mod: RT | Performed by: EMERGENCY MEDICINE

## 2023-05-25 PROCEDURE — 99284 EMERGENCY DEPT VISIT MOD MDM: CPT | Mod: 25 | Performed by: EMERGENCY MEDICINE

## 2023-05-25 PROCEDURE — 99285 EMERGENCY DEPT VISIT HI MDM: CPT | Mod: 25 | Performed by: EMERGENCY MEDICINE

## 2023-05-25 PROCEDURE — 250N000011 HC RX IP 250 OP 636: Performed by: EMERGENCY MEDICINE

## 2023-05-25 PROCEDURE — 250N000012 HC RX MED GY IP 250 OP 636 PS 637: Performed by: EMERGENCY MEDICINE

## 2023-05-25 PROCEDURE — 73562 X-RAY EXAM OF KNEE 3: CPT | Mod: RT

## 2023-05-25 RX ORDER — ONDANSETRON 4 MG/1
4 TABLET, ORALLY DISINTEGRATING ORAL EVERY 8 HOURS PRN
Qty: 10 TABLET | Refills: 0 | Status: SHIPPED | OUTPATIENT
Start: 2023-05-25 | End: 2023-05-28

## 2023-05-25 RX ORDER — TRIAMCINOLONE ACETONIDE 40 MG/ML
40 INJECTION, SUSPENSION INTRA-ARTICULAR; INTRAMUSCULAR ONCE
Status: COMPLETED | OUTPATIENT
Start: 2023-05-25 | End: 2023-05-25

## 2023-05-25 RX ORDER — PREDNISONE 20 MG/1
TABLET ORAL
Qty: 15 TABLET | Refills: 0 | Status: SHIPPED | OUTPATIENT
Start: 2023-05-25 | End: 2023-06-01

## 2023-05-25 RX ORDER — ONDANSETRON 4 MG/1
4 TABLET, ORALLY DISINTEGRATING ORAL ONCE
Status: COMPLETED | OUTPATIENT
Start: 2023-05-25 | End: 2023-05-25

## 2023-05-25 RX ORDER — PREDNISONE 20 MG/1
20 TABLET ORAL ONCE
Status: COMPLETED | OUTPATIENT
Start: 2023-05-25 | End: 2023-05-25

## 2023-05-25 RX ADMIN — PREDNISONE 20 MG: 20 TABLET ORAL at 19:20

## 2023-05-25 RX ADMIN — TRIAMCINOLONE ACETONIDE 40 MG: 40 INJECTION, SUSPENSION INTRA-ARTICULAR; INTRAMUSCULAR at 19:21

## 2023-05-25 RX ADMIN — ONDANSETRON 4 MG: 4 TABLET, ORALLY DISINTEGRATING ORAL at 19:21

## 2023-05-25 ASSESSMENT — ENCOUNTER SYMPTOMS
WEAKNESS: 0
FEVER: 0

## 2023-05-25 ASSESSMENT — ACTIVITIES OF DAILY LIVING (ADL): ADLS_ACUITY_SCORE: 35

## 2023-05-25 NOTE — ED NOTES
Pt right knee is swollen. States it came on suddenly about 4-5 weeks ago. Was seen last week at [facility] urgent care and prescribed prednisone which did not help. Tried heat, ice, Biofreeze, tylenol and ibuprofen. Has an MRI scheduled for Saturday.

## 2023-05-25 NOTE — ED TRIAGE NOTES
Right knee pain, was seen at [facility] on Wed, given prednisone, not improving ongoing right knee pain ambulated into triage       Triage Assessment     Row Name 05/25/23 1610       Triage Assessment (Adult)    Airway WDL WDL       Respiratory WDL    Respiratory WDL WDL       Peripheral/Neurovascular WDL    Peripheral Neurovascular WDL WDL       Cognitive/Neuro/Behavioral WDL    Cognitive/Neuro/Behavioral WDL WDL

## 2023-05-26 NOTE — DISCHARGE INSTRUCTIONS
You were seen today for a knee effusion. I'm not sure what caused it, but the fact that it's been going on for a month is reassuring.    I pulled some of the fluid off your knee and injected some numbing medication and Kenalog. I think this will provide a good amount of relief.    Please follow-up with orthopedics and get the MRI as planned.    If your knee starts feeling warm or turned red, or if you develop a fever, come back.    Thank you for your patience, and I will be feel better soon.

## 2023-05-26 NOTE — ED PROVIDER NOTES
History     Chief Complaint   Patient presents with     Knee Pain     Right knee pain, was seen at [facility] on Wed, given prednisone, not improving ongoing right knee pain ambulated into triage     HPI  Paula Mckeon is a 22 year old female who resents with right knee swelling and pain.  Patient says this has been going on for approximately a month.  She was seen by orthopedics and given prednisone and it resolved.  After she finished the prednisone it came back.  She is scheduled for an MRI.  She is very frustrated because of the pain and swelling.  She denies specific trauma.  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  She also has a long history of work that requires bending her knees.  Of note, her current job does not require as much bending and lifting.  No fevers. No weakness    She is hoping that someone will drain some of the fluid off.    Allergies:  Allergies   Allergen Reactions     Cortisone Shortness Of Breath     Following hip injection/pill form cortisone does fine with   Following hip injection       Keflex [Cephalexin]        Problem List:    There are no problems to display for this patient.       Past Medical History:    Past Medical History:   Diagnosis Date     Anxiety        Past Surgical History:    No past surgical history on file.    Family History:    No family history on file.    Social History:  Marital Status:  Single [1]  Social History     Tobacco Use     Smoking status: Never     Smokeless tobacco: Never   Substance Use Topics     Alcohol use: No     Drug use: No        Medications:    ondansetron (ZOFRAN ODT) 4 MG ODT tab  predniSONE (DELTASONE) 20 MG tablet  benzonatate (TESSALON) 200 MG capsule  dexamethasone (DECADRON) 4 MG tablet  Escitalopram Oxalate (LEXAPRO PO)  guaiFENesin-codeine (ROBITUSSIN AC) 100-10 MG/5ML solution  levalbuterol (XOPENEX HFA) 45 MCG/ACT inhaler  lidocaine, viscous, (XYLOCAINE) 2 % solution  meclizine (ANTIVERT) 12.5 MG  tablet  norethindrone-ethinyl estradiol (MICROGESTIN 1/20) 1-20 MG-MCG tablet          Review of Systems   Constitutional: Negative for fever.   Musculoskeletal:        R knee pain and swelling   Neurological: Negative for weakness.       Physical Exam   BP: 114/72  Pulse: 73  Temp: 98.9  F (37.2  C)  Resp: 10  SpO2: 99 %      Physical Exam  Constitutional:       General: She is not in acute distress.     Appearance: She is not ill-appearing.   HENT:      Head: Normocephalic.      Right Ear: External ear normal.      Left Ear: External ear normal.   Eyes:      General:         Right eye: No discharge.   Cardiovascular:      Rate and Rhythm: Normal rate.      Pulses: Normal pulses.   Pulmonary:      Effort: No respiratory distress.   Abdominal:      General: Abdomen is flat.   Musculoskeletal:      Comments: Knee effusion on the right, discrete tenderness over infrapatellar bursa and suprapatellar bursa, no redness    Bearing weight without difficulty     Distal foot wwp   Skin:     Capillary Refill: Capillary refill takes less than 2 seconds.      Coloration: Skin is not jaundiced.      Comments: No erythema or warmth of right knee   Neurological:      General: No focal deficit present.      Mental Status: She is alert and oriented to person, place, and time.   Psychiatric:      Comments: Very nice         ED Course              ED Course as of 05/25/23 2329   Thu May 25, 2023   1949 No ligamentous instability on exam.  Doubt dislocation.  No popliteal fullness fullness.  Doubt thrombosis.  Distal foot is warm and well-perfused.   1949 I reviewed the x-ray and I believe she has an effusion.   1949 Large knee effusion on x-ray.   2013 After written consent, I performed a diagnostic and therapeutic arthrocentesis of the patient's knee.  She tolerated very well with minimal pain.  There is no bleeding afterwards.  I will send studies.  I have a very low suspicion that the patient has joint infection so I will discharge  her at this time.  She does not want to wait for the studies.  I am going to prescribe her some prednisone as that has worked for her in the past.  She is also requesting Zofran as prednisone has a history of making her feel nausea.   2014 She has follow-up planned and also scheduled for an MRI.  She is having a great deal of relief after the withdrawal of about 12 cc of synovial fluid  and the injection of Marcaine with Kenalog   2024 I give the patient ER precautions and she expressed understanding.  She feels safe with discharge.  Her pain is much improved.  Her range of motion of the knee is much improved.  I think it safe for her to be discharged.  She would like to go home if possible.  I will discharge her at her request at this time.     Arthrocentesis    Date/Time: 5/25/2023 11:25 PM    Performed by: Jakub Holman MD  Authorized by: Jakub Holman MD    Risks, benefits and alternatives discussed.      LOCATION:   Location:  Knee  Knee:  R knee  ANESTHESIA (see MAR for exact dosages):   Anesthesia method:  Local infiltration  Local anesthetic:  Lidocaine 1% WITH epi      PROCEDURE DETAILS:     Needle gauge:  22 G    Ultrasound guidance: no      Approach:  Lateral    Aspirate amount:  12    Aspirate characteristics:  Clear    Steroid injected: yes      Specimen collected: yes      Dressing: adhesive bandage and sterile dressing      PROCEDURE  Describe Procedure: Proximal 12 mL of synovial fluid.  This improved the patient's pain significantly.  The pain improved a great deal and the fluid was clear.  I injected Kenalog and Marcaine to great improvement in the patient's pain.  She tolerated the procedure well without any complications.  Patient Tolerance:  Patient tolerated the procedure well with no immediate complications   Written consent obtained after discussion of the risks and benefits.             Results for orders placed or performed during the hospital encounter of 05/25/23 (from the  past 24 hour(s))   XR Knee Right 3 Views    Narrative    EXAM: XR KNEE RIGHT 3 VIEWS  LOCATION: [facility]  DATE/TIME: 5/25/2023 7:07 PM CDT    INDICATION: knee swelling x 1 month, suspect bursitis  COMPARISON: None.      Impression    IMPRESSION: Normal joint spaces and alignment. No acute fracture. There is a large knee joint effusion..       Medications   predniSONE (DELTASONE) tablet 20 mg (20 mg Oral $Given 5/25/23 1920)   triamcinolone (KENALOG-40) injection 40 mg (40 mg INTRA-ARTICULAR $Given by Other 5/25/23 1921)   ondansetron (ZOFRAN ODT) ODT tab 4 mg (4 mg Oral $Given 5/25/23 1921)       Assessments & Plan (with Medical Decision Making)     I do not think this is an infected joint and the exam is generally reassuring.  I will draw some fluid off the joint some studies.  I suspect she has a bursitis.  I will inject some steroid for that.  I am going to get an x-ray of the knee as well.  will reassess after the x-rays done.    I have reviewed the nursing notes.    I have reviewed the findings, diagnosis, plan and need for follow up with the patient.        Medical Decision Making  The patient's presentation was of moderate complexity (a chronic illness mild to moderate exacerbation, progression, or side effect of treatment).    The patient's evaluation involved:  ordering and/or review of 1 test(s) in this encounter (see separate area of note for details)    The patient's management necessitated high risk (a decision regarding emergency major procedure (arthrocentesis)).    This patient was seen in the setting of ER overcrowding, ER overuse due to inadequate primary care availability, chronic boarding, a national mental health crisis that has brought a record number of patient with mental health crises and anxiety to the ER, and understaffing of emergency medicine physicians, all of which have been shown to present risk and decrease patient satisfaction. Every effort was made to  provide optimum care to this patient despite the extreme, ongoing burden on the ER staff.     This note was in part created using dictation software in an effort to speed and improve patient care; any typos should be read with the frequent shortcomings of this technology in mind.      Discharge Medication List as of 5/25/2023  8:24 PM      START taking these medications    Details   ondansetron (ZOFRAN ODT) 4 MG ODT tab Take 1 tablet (4 mg) by mouth every 8 hours as needed for nausea, Disp-10 tablet, R-0, E-Prescribe      predniSONE (DELTASONE) 20 MG tablet Take 1 tablet twice a day as needed for knee swelling and pain, Disp-15 tablet, R-0, E-Prescribe             Final diagnoses:   Effusion of right knee       5/25/2023   [facility] EMERGENCY DEPT     Jakub Holman MD  05/25/23 0001

## 2023-12-02 ENCOUNTER — HOSPITAL ENCOUNTER (EMERGENCY)
Facility: CLINIC | Age: 23
Discharge: HOME | End: 2023-12-03
Attending: STUDENT IN AN ORGANIZED HEALTH CARE EDUCATION/TRAINING PROGRAM | Admitting: STUDENT IN AN ORGANIZED HEALTH CARE EDUCATION/TRAINING PROGRAM
Payer: COMMERCIAL

## 2023-12-02 ENCOUNTER — NURSE TRIAGE (OUTPATIENT)
Dept: NURSING | Facility: CLINIC | Age: 23
End: 2023-12-02
Payer: COMMERCIAL

## 2023-12-02 DIAGNOSIS — R51.9 ACUTE NONINTRACTABLE HEADACHE, UNSPECIFIED HEADACHE TYPE: ICD-10-CM

## 2023-12-02 LAB
ANION GAP SERPL CALCULATED.3IONS-SCNC: 13 MMOL/L (ref 7–15)
BASOPHILS # BLD AUTO: 0.1 10E3/UL (ref 0–0.2)
BASOPHILS NFR BLD AUTO: 1 %
BUN SERPL-MCNC: 8.4 MG/DL (ref 6–20)
CALCIUM SERPL-MCNC: 9.1 MG/DL (ref 8.6–10)
CHLORIDE SERPL-SCNC: 104 MMOL/L (ref 98–107)
CREAT SERPL-MCNC: 0.79 MG/DL (ref 0.51–0.95)
DEPRECATED HCO3 PLAS-SCNC: 23 MMOL/L (ref 22–29)
EGFRCR SERPLBLD CKD-EPI 2021: >90 ML/MIN/1.73M2
EOSINOPHIL # BLD AUTO: 0.3 10E3/UL (ref 0–0.7)
EOSINOPHIL NFR BLD AUTO: 3 %
ERYTHROCYTE [DISTWIDTH] IN BLOOD BY AUTOMATED COUNT: 12.2 % (ref 10–15)
GLUCOSE SERPL-MCNC: 106 MG/DL (ref 70–99)
HCG INTACT+B SERPL-ACNC: <1 MIU/ML
HCT VFR BLD AUTO: 41.5 % (ref 35–47)
HGB BLD-MCNC: 14.6 G/DL (ref 11.7–15.7)
HOLD SPECIMEN: NORMAL
IMM GRANULOCYTES # BLD: 0 10E3/UL
IMM GRANULOCYTES NFR BLD: 0 %
LYMPHOCYTES # BLD AUTO: 3.3 10E3/UL (ref 0.8–5.3)
LYMPHOCYTES NFR BLD AUTO: 38 %
MCH RBC QN AUTO: 30.3 PG (ref 26.5–33)
MCHC RBC AUTO-ENTMCNC: 35.2 G/DL (ref 31.5–36.5)
MCV RBC AUTO: 86 FL (ref 78–100)
MONOCYTES # BLD AUTO: 0.6 10E3/UL (ref 0–1.3)
MONOCYTES NFR BLD AUTO: 7 %
NEUTROPHILS # BLD AUTO: 4.4 10E3/UL (ref 1.6–8.3)
NEUTROPHILS NFR BLD AUTO: 51 %
NRBC # BLD AUTO: 0 10E3/UL
NRBC BLD AUTO-RTO: 0 /100
PLATELET # BLD AUTO: 271 10E3/UL (ref 150–450)
POTASSIUM SERPL-SCNC: 3.5 MMOL/L (ref 3.4–5.3)
RBC # BLD AUTO: 4.82 10E6/UL (ref 3.8–5.2)
SODIUM SERPL-SCNC: 140 MMOL/L (ref 135–145)
WBC # BLD AUTO: 8.6 10E3/UL (ref 4–11)

## 2023-12-02 PROCEDURE — 85025 COMPLETE CBC W/AUTO DIFF WBC: CPT | Performed by: STUDENT IN AN ORGANIZED HEALTH CARE EDUCATION/TRAINING PROGRAM

## 2023-12-02 PROCEDURE — 250N000012 HC RX MED GY IP 250 OP 636 PS 637: Performed by: STUDENT IN AN ORGANIZED HEALTH CARE EDUCATION/TRAINING PROGRAM

## 2023-12-02 PROCEDURE — 84702 CHORIONIC GONADOTROPIN TEST: CPT | Performed by: STUDENT IN AN ORGANIZED HEALTH CARE EDUCATION/TRAINING PROGRAM

## 2023-12-02 PROCEDURE — 96375 TX/PRO/DX INJ NEW DRUG ADDON: CPT | Performed by: STUDENT IN AN ORGANIZED HEALTH CARE EDUCATION/TRAINING PROGRAM

## 2023-12-02 PROCEDURE — 96374 THER/PROPH/DIAG INJ IV PUSH: CPT | Performed by: STUDENT IN AN ORGANIZED HEALTH CARE EDUCATION/TRAINING PROGRAM

## 2023-12-02 PROCEDURE — 250N000009 HC RX 250: Performed by: STUDENT IN AN ORGANIZED HEALTH CARE EDUCATION/TRAINING PROGRAM

## 2023-12-02 PROCEDURE — 96361 HYDRATE IV INFUSION ADD-ON: CPT | Performed by: STUDENT IN AN ORGANIZED HEALTH CARE EDUCATION/TRAINING PROGRAM

## 2023-12-02 PROCEDURE — 250N000011 HC RX IP 250 OP 636: Mod: JZ | Performed by: STUDENT IN AN ORGANIZED HEALTH CARE EDUCATION/TRAINING PROGRAM

## 2023-12-02 PROCEDURE — 99284 EMERGENCY DEPT VISIT MOD MDM: CPT | Mod: 25 | Performed by: STUDENT IN AN ORGANIZED HEALTH CARE EDUCATION/TRAINING PROGRAM

## 2023-12-02 PROCEDURE — 80048 BASIC METABOLIC PNL TOTAL CA: CPT | Performed by: STUDENT IN AN ORGANIZED HEALTH CARE EDUCATION/TRAINING PROGRAM

## 2023-12-02 PROCEDURE — 258N000003 HC RX IP 258 OP 636: Performed by: STUDENT IN AN ORGANIZED HEALTH CARE EDUCATION/TRAINING PROGRAM

## 2023-12-02 PROCEDURE — 36415 COLL VENOUS BLD VENIPUNCTURE: CPT | Performed by: STUDENT IN AN ORGANIZED HEALTH CARE EDUCATION/TRAINING PROGRAM

## 2023-12-02 PROCEDURE — 99284 EMERGENCY DEPT VISIT MOD MDM: CPT | Performed by: STUDENT IN AN ORGANIZED HEALTH CARE EDUCATION/TRAINING PROGRAM

## 2023-12-02 RX ORDER — TETRACAINE HYDROCHLORIDE 5 MG/ML
1-2 SOLUTION OPHTHALMIC ONCE
Status: COMPLETED | OUTPATIENT
Start: 2023-12-02 | End: 2023-12-02

## 2023-12-02 RX ORDER — KETOROLAC TROMETHAMINE 15 MG/ML
15 INJECTION, SOLUTION INTRAMUSCULAR; INTRAVENOUS ONCE
Status: COMPLETED | OUTPATIENT
Start: 2023-12-02 | End: 2023-12-02

## 2023-12-02 RX ORDER — DEXAMETHASONE 4 MG/1
4 TABLET ORAL EVERY 12 HOURS SCHEDULED
Status: DISCONTINUED | OUTPATIENT
Start: 2023-12-02 | End: 2023-12-03 | Stop reason: HOSPADM

## 2023-12-02 RX ORDER — ONDANSETRON 2 MG/ML
4 INJECTION INTRAMUSCULAR; INTRAVENOUS EVERY 30 MIN PRN
Status: DISCONTINUED | OUTPATIENT
Start: 2023-12-02 | End: 2023-12-03 | Stop reason: HOSPADM

## 2023-12-02 RX ORDER — MAGNESIUM SULFATE HEPTAHYDRATE 40 MG/ML
2 INJECTION, SOLUTION INTRAVENOUS ONCE
Status: COMPLETED | OUTPATIENT
Start: 2023-12-02 | End: 2023-12-03

## 2023-12-02 RX ADMIN — SODIUM CHLORIDE 1000 ML: 9 INJECTION, SOLUTION INTRAVENOUS at 23:22

## 2023-12-02 RX ADMIN — KETOROLAC TROMETHAMINE 15 MG: 15 INJECTION, SOLUTION INTRAMUSCULAR; INTRAVENOUS at 23:23

## 2023-12-02 RX ADMIN — DEXAMETHASONE 4 MG: 4 TABLET ORAL at 23:22

## 2023-12-02 RX ADMIN — ONDANSETRON 4 MG: 2 INJECTION INTRAMUSCULAR; INTRAVENOUS at 23:23

## 2023-12-02 RX ADMIN — MAGNESIUM SULFATE HEPTAHYDRATE 2 G: 2 INJECTION, SOLUTION INTRAVENOUS at 23:22

## 2023-12-02 RX ADMIN — TETRACAINE HYDROCHLORIDE 2 DROP: 5 SOLUTION OPHTHALMIC at 23:25

## 2023-12-02 ASSESSMENT — ACTIVITIES OF DAILY LIVING (ADL): ADLS_ACUITY_SCORE: 35

## 2023-12-03 VITALS
WEIGHT: 111.88 LBS | HEART RATE: 80 BPM | SYSTOLIC BLOOD PRESSURE: 114 MMHG | DIASTOLIC BLOOD PRESSURE: 67 MMHG | OXYGEN SATURATION: 99 % | TEMPERATURE: 98.3 F | RESPIRATION RATE: 20 BRPM | HEIGHT: 63 IN | BODY MASS INDEX: 19.82 KG/M2

## 2023-12-03 LAB
HOLD SPECIMEN: NORMAL
HOLD SPECIMEN: NORMAL

## 2023-12-03 PROCEDURE — 96361 HYDRATE IV INFUSION ADD-ON: CPT | Performed by: STUDENT IN AN ORGANIZED HEALTH CARE EDUCATION/TRAINING PROGRAM

## 2023-12-03 NOTE — ED NOTES
Pt c/o flushed feeling and chest pressure after receiving magnesium. MD updated and aware, no new orders.

## 2023-12-03 NOTE — TELEPHONE ENCOUNTER
"'s clinic in [place], [place]  \"I have a hx of migraine. I am having weird head pain on the left side of my head, severe pain behind the left eye. Lower left base of skull I have a burning sensation like a hot curling iron is burning me there. I don't usually get this with my migraine. Not this intense--I usually get pressure behind the left eye. I am nauseated which is normal for me with a migraine.    I have taken 500mg of Tylenol 4 hrs ago: it did not help.   I take OBC--my dosage was increased in July. I have been having headaches since then, but not like this\".    Triaged to a disposition of Go to ED now, which she agrees to do.    Sonia El RN Triage Nurse Advisor 10:01 PM 12/2/2023  Reason for Disposition   Severe pain in one eye    Additional Information   Negative: Difficult to awaken or acting confused (e.g., disoriented, slurred speech)   Negative: [1] Weakness of the face, arm or leg on one side of the body AND [2] new-onset   Negative: [1] Numbness of the face, arm or leg on one side of the body AND [2] new-onset   Negative: [1] Loss of speech or garbled speech AND [2] new-onset   Negative: Passed out (i.e., lost consciousness, collapsed and was not responding)   Negative: Sounds like a life-threatening emergency to the triager   Negative: Followed a head injury   Negative: Pregnant   Negative: Postpartum (from 0 to 6 weeks after delivery)   Negative: Traumatic Brain Injury (TBI) is suspected   Negative: Unable to walk, or can only walk with assistance (e.g., requires support)   Negative: Stiff neck (can't touch chin to chest)    Protocols used: Headache-A-AH    "

## 2023-12-03 NOTE — ED TRIAGE NOTES
"Pt presents with left sided headache and eye pain that began this morning. Pt also reports lower left side of posterior head has a burning sensation. Hx of migraines and \"period\" headaches. Took 500 mg of tylenol >4 hrs ago without relief.      Triage Assessment (Adult)       Row Name 12/02/23 3731          Triage Assessment    Airway WDL WDL        Respiratory WDL    Respiratory WDL WDL        Skin Circulation/Temperature WDL    Skin Circulation/Temperature WDL WDL        Cardiac WDL    Cardiac WDL WDL        Peripheral/Neurovascular WDL    Peripheral Neurovascular WDL WDL        Cognitive/Neuro/Behavioral WDL    Cognitive/Neuro/Behavioral WDL WDL                     "

## 2023-12-03 NOTE — ED PROVIDER NOTES
History     Chief Complaint   Patient presents with    Headache     HPI  Paula Yadav is a 23 year old female who has a history of migraine headaches who presents to the emergency department for evaluation of a headache.  She states that headache started this morning.  She took 500 mg of Tylenol earlier today without relief.  States she has a sharp pain behind her left eye as well as a sharp pain in the left side of her head.  She also has a burning sensation on the left side of her neck that comes up the back of her scalp and up to the top of her head.  She states she does get headaches somewhat frequently, but this is the worst when she has had.  She states she does get headaches associated with her menstrual cycles.  She is not due up to have her menstrual cycle for another week and a half.  She states that usually it is more of a dull ache and is not a sharp pain like this.  She endorses photophobia.  She denies any vision issues.  She does report a history of glaucoma.  The only medication that she is on is birth control.  She states that she does sometimes get headaches associated with her menstrual cycle. She has nausea without vomiting.  She denies fever.  She denies any recent illness.  No trauma or head injuries.  No weakness in the extremities.  No paresthesias.  She denies any other symptoms.  She denies alcohol or drugs.    Allergies:  Allergies   Allergen Reactions    Cortisone Shortness Of Breath     Following hip injection/pill form cortisone does fine with   Following hip injection      Keflex [Cephalexin]        Problem List:    There are no problems to display for this patient.       Past Medical History:    Past Medical History:   Diagnosis Date    Anxiety        Past Surgical History:    No past surgical history on file.    Family History:    No family history on file.    Social History:  Marital Status:  Single [1]  Social History     Tobacco Use    Smoking status: Never    Smokeless  "tobacco: Never   Substance Use Topics    Alcohol use: No    Drug use: No        Medications:    benzonatate (TESSALON) 200 MG capsule  dexamethasone (DECADRON) 4 MG tablet  Escitalopram Oxalate (LEXAPRO PO)  guaiFENesin-codeine (ROBITUSSIN AC) 100-10 MG/5ML solution  levalbuterol (XOPENEX HFA) 45 MCG/ACT inhaler  lidocaine, viscous, (XYLOCAINE) 2 % solution  meclizine (ANTIVERT) 12.5 MG tablet  norethindrone-ethinyl estradiol (MICROGESTIN 1/20) 1-20 MG-MCG tablet          Review of Systems  See HPI  Physical Exam   BP: 120/83  Pulse: 82  Temp: 98.3  F (36.8  C)  Resp: 16  Height: 160 cm (5' 3\")  Weight: 50.8 kg (111 lb 14.1 oz)  SpO2: 100 %      Physical Exam  /83   Pulse 61   Temp 98.3  F (36.8  C) (Oral)   Resp 14   Ht 1.6 m (5' 3\")   Wt 50.8 kg (111 lb 14.1 oz)   SpO2 98%   BMI 19.82 kg/m    General: alert, interactive, in no apparent distress  Head: atraumatic, left scalp slightly tender to palpation.  There is no signs of injury no skin changes.  Nose: no rhinorrhea or epistaxis  Ears: no external auditory canal discharge or bleeding.    Eyes: Sclera nonicteric. Conjunctiva noninjected. PERRL, EOMI.  Left-sided intraocular pressure is 17  Mouth: no tonsillar erythema, edema, or exudate.  Moist mucous membranes  Neck: supple, full range of motion.  No neck stiffness.  No midline cervical tenderness  Lungs: No increased work of breathing.  Clear to auscultation bilaterally.  CV: RRR, peripheral pulses palpable and symmetric  Abdomen: soft, nt, nd, no guarding or rebound.   Extremities: Warm and well-perfused.  Skin: no rash or diaphoresis  Neuro: CN II-XII grossly intact, strength 5/5 in UE and LEs bilaterally, sensation intact to light touch in UE and LEs bilaterally;     ED Course                 Procedures           Critical Care time:  none         Results for orders placed or performed during the hospital encounter of 12/02/23 (from the past 24 hour(s))   [place] Draw    Narrative    The " following orders were created for panel order [place] Draw.  Procedure                               Abnormality         Status                     ---------                               -----------         ------                     Extra Red Top Tube[[id number]]                               Final result               Extra Green Top (Lithium...[[id number]]                      Final result               Extra Purple Top Tube[[id number]]                            Final result                 Please view results for these tests on the individual orders.   Extra Red Top Tube   Result Value Ref Range    Hold Specimen JIC    Extra Green Top (Lithium Heparin) Tube   Result Value Ref Range    Hold Specimen JIC    Extra Purple Top Tube   Result Value Ref Range    Hold Specimen     HCG quantitative pregnancy (blood)   Result Value Ref Range    hCG Quantitative <1 <5 mIU/mL   CBC with platelets differential    Narrative    The following orders were created for panel order CBC with platelets differential.  Procedure                               Abnormality         Status                     ---------                               -----------         ------                     CBC with platelets and d...[[id number]]                      Final result                 Please view results for these tests on the individual orders.   Basic metabolic panel   Result Value Ref Range    Sodium 140 135 - 145 mmol/L    Potassium 3.5 3.4 - 5.3 mmol/L    Chloride 104 98 - 107 mmol/L    Carbon Dioxide (CO2) 23 22 - 29 mmol/L    Anion Gap 13 7 - 15 mmol/L    Urea Nitrogen 8.4 6.0 - 20.0 mg/dL    Creatinine 0.79 0.51 - 0.95 mg/dL    GFR Estimate >90 >60 mL/min/1.73m2    Calcium 9.1 8.6 - 10.0 mg/dL    Glucose 106 (H) 70 - 99 mg/dL   CBC with platelets and differential   Result Value Ref Range    WBC Count 8.6 4.0 - 11.0 10e3/uL    RBC Count 4.82 3.80 - 5.20 10e6/uL    Hemoglobin 14.6 11.7 - 15.7 g/dL    Hematocrit 41.5 35.0 - 47.0 %    MCV 86  78 - 100 fL    MCH 30.3 26.5 - 33.0 pg    MCHC 35.2 31.5 - 36.5 g/dL    RDW 12.2 10.0 - 15.0 %    Platelet Count 271 150 - 450 10e3/uL    % Neutrophils 51 %    % Lymphocytes 38 %    % Monocytes 7 %    % Eosinophils 3 %    % Basophils 1 %    % Immature Granulocytes 0 %    NRBCs per 100 WBC 0 <1 /100    Absolute Neutrophils 4.4 1.6 - 8.3 10e3/uL    Absolute Lymphocytes 3.3 0.8 - 5.3 10e3/uL    Absolute Monocytes 0.6 0.0 - 1.3 10e3/uL    Absolute Eosinophils 0.3 0.0 - 0.7 10e3/uL    Absolute Basophils 0.1 0.0 - 0.2 10e3/uL    Absolute Immature Granulocytes 0.0 <=0.4 10e3/uL    Absolute NRBCs 0.0 10e3/uL       Medications   ondansetron (ZOFRAN) injection 4 mg (4 mg Intravenous $Given 12/2/23 2323)   dexAMETHasone (DECADRON) tablet 4 mg (4 mg Oral $Given 12/2/23 2322)   sodium chloride 0.9% BOLUS 1,000 mL (0 mLs Intravenous Stopped 12/3/23 0053)   ketorolac (TORADOL) injection 15 mg (15 mg Intravenous $Given 12/2/23 2323)   magnesium sulfate 2 g in 50 mL sterile water intermittent infusion (0 g Intravenous Stopped 12/3/23 0019)   tetracaine (PONTOCAINE) 0.5 % ophthalmic solution 1-2 drop (2 drops Left Eye $Given 12/2/23 2325)       Assessments & Plan (with Medical Decision Making)     I have reviewed the nursing notes.    I have reviewed the findings, diagnosis, plan and need for follow up with the patient.    Medical Decision Making  Paula Yadav is a 23 year old female who has a history of migraine headaches who presents to the emergency department for evaluation of a headache.  Vital signs are reassuring.  Patient has a completely normal neurological exam.  She has normal intraocular pressure.  She is not pregnant.  CBC and BMP are normal.  I suspect that her symptoms are due to primary headache disorder such as severe migraine.  She was given a liter of normal saline, magnesium, Toradol, Decadron and Zofran.  On reassessment, she states her headache is better.  She would like to go home and sleep.  I think this is  appropriate.  Recommended Tylenol and ibuprofen as needed for pain.  She is offered a prescription for Zofran and declined.  Recommend close outpatient follow-up.  Return precautions discussed.  All questions answered.  Patient discharged in stable condition.      New Prescriptions    No medications on file       Final diagnoses:   Acute nonintractable headache, unspecified headache type       12/2/2023   [facility] EMERGENCY DEPT       Ezequiel Ku MD  12/03/23 0058

## 2024-01-28 ENCOUNTER — HEALTH MAINTENANCE LETTER (OUTPATIENT)
Age: 24
End: 2024-01-28

## 2024-03-27 ENCOUNTER — LAB REQUISITION (OUTPATIENT)
Dept: LAB | Facility: CLINIC | Age: 24
End: 2024-03-27

## 2024-03-27 DIAGNOSIS — O36.8990 MATERNAL CARE FOR OTHER SPECIFIED FETAL PROBLEMS, UNSPECIFIED TRIMESTER, NOT APPLICABLE OR UNSPECIFIED: ICD-10-CM

## 2024-03-27 PROCEDURE — 84702 CHORIONIC GONADOTROPIN TEST: CPT | Performed by: OBSTETRICS & GYNECOLOGY

## 2024-03-28 LAB — HCG INTACT+B SERPL-ACNC: 9950 MIU/ML

## 2024-04-17 ENCOUNTER — LAB REQUISITION (OUTPATIENT)
Dept: LAB | Facility: CLINIC | Age: 24
End: 2024-04-17

## 2024-04-17 DIAGNOSIS — O36.8990 MATERNAL CARE FOR OTHER SPECIFIED FETAL PROBLEMS, UNSPECIFIED TRIMESTER, NOT APPLICABLE OR UNSPECIFIED: ICD-10-CM

## 2024-04-17 PROCEDURE — 84702 CHORIONIC GONADOTROPIN TEST: CPT | Performed by: OBSTETRICS & GYNECOLOGY

## 2024-04-19 LAB — HCG INTACT+B SERPL-ACNC: ABNORMAL MIU/ML

## 2024-05-06 ENCOUNTER — LAB REQUISITION (OUTPATIENT)
Dept: LAB | Facility: CLINIC | Age: 24
End: 2024-05-06

## 2024-05-06 DIAGNOSIS — O02.1 MISSED ABORTION: ICD-10-CM

## 2024-05-06 PROCEDURE — 84702 CHORIONIC GONADOTROPIN TEST: CPT | Performed by: OBSTETRICS & GYNECOLOGY

## 2024-05-07 LAB — HCG INTACT+B SERPL-ACNC: 37 MIU/ML

## 2024-05-13 ENCOUNTER — LAB REQUISITION (OUTPATIENT)
Dept: LAB | Facility: CLINIC | Age: 24
End: 2024-05-13

## 2024-05-13 DIAGNOSIS — O02.1 MISSED ABORTION: ICD-10-CM

## 2024-05-13 PROCEDURE — 84702 CHORIONIC GONADOTROPIN TEST: CPT | Performed by: ADVANCED PRACTICE MIDWIFE

## 2024-05-14 LAB — HCG INTACT+B SERPL-ACNC: 19 MIU/ML

## 2024-06-10 ENCOUNTER — LAB REQUISITION (OUTPATIENT)
Dept: LAB | Facility: CLINIC | Age: 24
End: 2024-06-10

## 2024-06-10 DIAGNOSIS — O02.1 MISSED ABORTION: ICD-10-CM

## 2024-06-10 LAB — HCG INTACT+B SERPL-ACNC: 1 MIU/ML

## 2024-06-10 PROCEDURE — 84702 CHORIONIC GONADOTROPIN TEST: CPT | Performed by: OBSTETRICS & GYNECOLOGY

## 2024-09-03 ENCOUNTER — LAB REQUISITION (OUTPATIENT)
Dept: LAB | Facility: CLINIC | Age: 24
End: 2024-09-03
Payer: COMMERCIAL

## 2024-09-03 DIAGNOSIS — Z32.01 ENCOUNTER FOR PREGNANCY TEST, RESULT POSITIVE: ICD-10-CM

## 2024-09-03 PROCEDURE — 84702 CHORIONIC GONADOTROPIN TEST: CPT | Performed by: PHYSICIAN ASSISTANT

## 2024-09-04 LAB — HCG INTACT+B SERPL-ACNC: 19 MIU/ML

## 2024-09-05 ENCOUNTER — LAB REQUISITION (OUTPATIENT)
Dept: LAB | Facility: CLINIC | Age: 24
End: 2024-09-05

## 2024-09-05 DIAGNOSIS — Z32.01 ENCOUNTER FOR PREGNANCY TEST, RESULT POSITIVE: ICD-10-CM

## 2024-09-05 PROCEDURE — 84702 CHORIONIC GONADOTROPIN TEST: CPT | Performed by: PHYSICIAN ASSISTANT

## 2024-09-06 LAB — HCG INTACT+B SERPL-ACNC: 8 MIU/ML

## 2024-10-01 ENCOUNTER — HOSPITAL ENCOUNTER (OUTPATIENT)
Facility: CLINIC | Age: 24
Discharge: HOME | End: 2024-10-01
Admitting: OBSTETRICS & GYNECOLOGY
Payer: COMMERCIAL

## 2024-10-01 ENCOUNTER — LAB REQUISITION (OUTPATIENT)
Dept: LAB | Facility: CLINIC | Age: 24
End: 2024-10-01

## 2024-10-01 ENCOUNTER — LAB REQUISITION (OUTPATIENT)
Dept: LAB | Facility: CLINIC | Age: 24
End: 2024-10-01
Payer: COMMERCIAL

## 2024-10-01 DIAGNOSIS — Z82.49 FAMILY HISTORY OF ISCHEMIC HEART DISEASE AND OTHER DISEASES OF THE CIRCULATORY SYSTEM: ICD-10-CM

## 2024-10-01 DIAGNOSIS — N96 RECURRENT PREGNANCY LOSS: ICD-10-CM

## 2024-10-01 LAB
FACTOR V INTERPRETATION: NORMAL
HOLD SPECIMEN: NORMAL
LAB DIRECTOR COMMENTS: NORMAL
LAB DIRECTOR DISCLAIMER: NORMAL
LAB DIRECTOR INTERPRETATION: NORMAL
LAB DIRECTOR METHODOLOGY: NORMAL
LAB DIRECTOR RESULTS: NORMAL
SPECIMEN DESCRIPTION: NORMAL

## 2024-10-01 PROCEDURE — G0452 MOLECULAR PATHOLOGY INTERPR: HCPCS | Performed by: PATHOLOGY

## 2024-10-01 PROCEDURE — 86376 MICROSOMAL ANTIBODY EACH: CPT | Performed by: OBSTETRICS & GYNECOLOGY

## 2024-10-01 PROCEDURE — 86146 BETA-2 GLYCOPROTEIN ANTIBODY: CPT | Performed by: OBSTETRICS & GYNECOLOGY

## 2024-10-01 PROCEDURE — 81241 F5 GENE: CPT | Performed by: OBSTETRICS & GYNECOLOGY

## 2024-10-01 PROCEDURE — 88280 CHROMOSOME KARYOTYPE STUDY: CPT | Mod: ORL | Performed by: OBSTETRICS & GYNECOLOGY

## 2024-10-01 PROCEDURE — 86147 CARDIOLIPIN ANTIBODY EA IG: CPT | Performed by: OBSTETRICS & GYNECOLOGY

## 2024-10-01 PROCEDURE — 85730 THROMBOPLASTIN TIME PARTIAL: CPT | Mod: ORL | Performed by: OBSTETRICS & GYNECOLOGY

## 2024-10-01 PROCEDURE — 84443 ASSAY THYROID STIM HORMONE: CPT | Performed by: OBSTETRICS & GYNECOLOGY

## 2024-10-02 LAB
THYROPEROXIDASE AB SERPL-ACNC: 10 IU/ML
TSH SERPL DL<=0.005 MIU/L-ACNC: 1.13 UIU/ML (ref 0.3–4.2)

## 2024-10-03 LAB
DRVVT SCREEN RATIO: 0.97
INR PPP: 1.04 (ref 0.85–1.15)
LA PPP-IMP: NEGATIVE
LUPUS INTERPRETATION: NORMAL
PTT RATIO: 1.09
THROMBIN TIME: 17.6 SECONDS (ref 13–19)

## 2024-10-06 LAB
B2 GLYCOPROT1 IGG SERPL IA-ACNC: <0.8 U/ML
B2 GLYCOPROT1 IGM SERPL IA-ACNC: <2.4 U/ML
CARDIOLIPIN IGG SER IA-ACNC: <2 GPL-U/ML
CARDIOLIPIN IGG SER IA-ACNC: NEGATIVE
CARDIOLIPIN IGM SER IA-ACNC: 2 MPL-U/ML
CARDIOLIPIN IGM SER IA-ACNC: NEGATIVE

## 2024-10-15 LAB — SCANNED LAB RESULT: NORMAL

## 2024-10-17 ENCOUNTER — DOCUMENTATION ONLY (OUTPATIENT)
Dept: LAB | Facility: CLINIC | Age: 24
End: 2024-10-17
Payer: COMMERCIAL

## 2024-10-17 NOTE — PROGRESS NOTES
Paula is requesting HCG Quant testing at a lab appt scheduled 10/21/24 or contact pt with further info.

## 2024-10-19 ENCOUNTER — LAB (OUTPATIENT)
Dept: LAB | Facility: CLINIC | Age: 24
End: 2024-10-19
Payer: COMMERCIAL

## 2024-10-19 DIAGNOSIS — Z32.01 ENCOUNTER FOR PREGNANCY TEST, RESULT POSITIVE: Primary | ICD-10-CM

## 2024-10-19 DIAGNOSIS — Z32.01 ENCOUNTER FOR PREGNANCY TEST, RESULT POSITIVE: ICD-10-CM

## 2024-10-19 LAB — HCG INTACT+B SERPL-ACNC: ABNORMAL MIU/ML

## 2024-10-19 PROCEDURE — 84702 CHORIONIC GONADOTROPIN TEST: CPT

## 2024-10-19 PROCEDURE — 36415 COLL VENOUS BLD VENIPUNCTURE: CPT

## 2024-10-21 ENCOUNTER — LAB (OUTPATIENT)
Dept: LAB | Facility: CLINIC | Age: 24
End: 2024-10-21
Payer: COMMERCIAL

## 2024-10-21 DIAGNOSIS — Z32.01 ENCOUNTER FOR PREGNANCY TEST, RESULT POSITIVE: ICD-10-CM

## 2024-10-21 LAB — HCG INTACT+B SERPL-ACNC: ABNORMAL MIU/ML

## 2024-10-21 PROCEDURE — 36415 COLL VENOUS BLD VENIPUNCTURE: CPT

## 2024-10-21 PROCEDURE — 84702 CHORIONIC GONADOTROPIN TEST: CPT

## 2024-11-07 ENCOUNTER — LAB REQUISITION (OUTPATIENT)
Dept: LAB | Facility: CLINIC | Age: 24
End: 2024-11-07

## 2024-11-07 DIAGNOSIS — Z36.89 ENCOUNTER FOR OTHER SPECIFIED ANTENATAL SCREENING: ICD-10-CM

## 2024-11-07 LAB
BASOPHILS # BLD AUTO: 0 10E3/UL (ref 0–0.2)
BASOPHILS NFR BLD AUTO: 0 %
EOSINOPHIL # BLD AUTO: 0.6 10E3/UL (ref 0–0.7)
EOSINOPHIL NFR BLD AUTO: 5 %
ERYTHROCYTE [DISTWIDTH] IN BLOOD BY AUTOMATED COUNT: 13.3 % (ref 10–15)
HCT VFR BLD AUTO: 38.2 % (ref 35–47)
HGB BLD-MCNC: 12.8 G/DL (ref 11.7–15.7)
HIV 1+2 AB+HIV1 P24 AG SERPL QL IA: NONREACTIVE
IMM GRANULOCYTES # BLD: 0.1 10E3/UL
IMM GRANULOCYTES NFR BLD: 0 %
LYMPHOCYTES # BLD AUTO: 2.7 10E3/UL (ref 0.8–5.3)
LYMPHOCYTES NFR BLD AUTO: 23 %
MCH RBC QN AUTO: 29.4 PG (ref 26.5–33)
MCHC RBC AUTO-ENTMCNC: 33.5 G/DL (ref 31.5–36.5)
MCV RBC AUTO: 88 FL (ref 78–100)
MONOCYTES # BLD AUTO: 0.7 10E3/UL (ref 0–1.3)
MONOCYTES NFR BLD AUTO: 6 %
NEUTROPHILS # BLD AUTO: 7.8 10E3/UL (ref 1.6–8.3)
NEUTROPHILS NFR BLD AUTO: 66 %
NRBC # BLD AUTO: 0 10E3/UL
NRBC BLD AUTO-RTO: 0 /100
PLATELET # BLD AUTO: 245 10E3/UL (ref 150–450)
RBC # BLD AUTO: 4.35 10E6/UL (ref 3.8–5.2)
WBC # BLD AUTO: 11.8 10E3/UL (ref 4–11)

## 2024-11-07 PROCEDURE — 85004 AUTOMATED DIFF WBC COUNT: CPT | Performed by: ADVANCED PRACTICE MIDWIFE

## 2024-11-07 PROCEDURE — 86850 RBC ANTIBODY SCREEN: CPT | Performed by: ADVANCED PRACTICE MIDWIFE

## 2024-11-07 PROCEDURE — 86900 BLOOD TYPING SEROLOGIC ABO: CPT | Performed by: ADVANCED PRACTICE MIDWIFE

## 2024-11-07 PROCEDURE — 86901 BLOOD TYPING SEROLOGIC RH(D): CPT | Performed by: ADVANCED PRACTICE MIDWIFE

## 2024-11-07 PROCEDURE — 87389 HIV-1 AG W/HIV-1&-2 AB AG IA: CPT | Performed by: ADVANCED PRACTICE MIDWIFE

## 2024-11-07 PROCEDURE — 86880 COOMBS TEST DIRECT: CPT | Performed by: ADVANCED PRACTICE MIDWIFE

## 2024-11-08 LAB
ABO/RH TYPE: NORMAL
ANTIBODY SCREEN: NEGATIVE
BLOOD BANK CHART COMMENT: NORMAL
DAT, ANTI-IGG, TUBE: NEGATIVE
SPECIMEN EXPIRATION DATE: NORMAL

## 2024-11-25 ENCOUNTER — LAB REQUISITION (OUTPATIENT)
Dept: LAB | Facility: CLINIC | Age: 24
End: 2024-11-25
Payer: COMMERCIAL

## 2024-11-25 DIAGNOSIS — Z36.0 ENCOUNTER FOR ANTENATAL SCREENING FOR CHROMOSOMAL ANOMALIES: ICD-10-CM

## 2024-11-25 PROCEDURE — 81403 MOPATH PROCEDURE LEVEL 4: CPT | Mod: ORL | Performed by: ADVANCED PRACTICE MIDWIFE

## 2024-12-10 LAB — SCANNED LAB RESULT: NORMAL

## 2024-12-30 ENCOUNTER — LAB REQUISITION (OUTPATIENT)
Dept: LAB | Facility: CLINIC | Age: 24
End: 2024-12-30

## 2024-12-30 DIAGNOSIS — Z36.9 ENCOUNTER FOR ANTENATAL SCREENING, UNSPECIFIED: ICD-10-CM

## 2024-12-30 PROCEDURE — 82105 ALPHA-FETOPROTEIN SERUM: CPT | Performed by: OBSTETRICS & GYNECOLOGY

## 2025-01-01 LAB
# FETUSES US: NORMAL
AFP MOM SERPL: 1.12
AFP SERPL-MCNC: 49 NG/ML
AGE - REPORTED: 26 YR
CURRENT SMOKER: YES
FAMILY MEMBER DISEASES HX: NORMAL
GA METHOD: NORMAL
GA: NORMAL WK
IDDM PATIENT QL: NO
INTEGRATED SCN PATIENT-IMP: NORMAL
SPECIMEN DRAWN SERPL: NORMAL

## 2025-02-01 ENCOUNTER — HEALTH MAINTENANCE LETTER (OUTPATIENT)
Age: 25
End: 2025-02-01

## 2025-02-04 ENCOUNTER — HOSPITAL ENCOUNTER (EMERGENCY)
Facility: CLINIC | Age: 25
Discharge: HOME | End: 2025-02-04
Attending: STUDENT IN AN ORGANIZED HEALTH CARE EDUCATION/TRAINING PROGRAM | Admitting: STUDENT IN AN ORGANIZED HEALTH CARE EDUCATION/TRAINING PROGRAM
Payer: COMMERCIAL

## 2025-02-04 VITALS
TEMPERATURE: 99.6 F | RESPIRATION RATE: 18 BRPM | OXYGEN SATURATION: 97 % | SYSTOLIC BLOOD PRESSURE: 119 MMHG | HEART RATE: 127 BPM | DIASTOLIC BLOOD PRESSURE: 74 MMHG

## 2025-02-04 DIAGNOSIS — Z34.92 SECOND TRIMESTER PREGNANCY: ICD-10-CM

## 2025-02-04 DIAGNOSIS — J10.1 INFLUENZA A: ICD-10-CM

## 2025-02-04 LAB
FLUAV RNA SPEC QL NAA+PROBE: POSITIVE
FLUBV RNA RESP QL NAA+PROBE: NEGATIVE
RSV RNA SPEC NAA+PROBE: NEGATIVE
SARS-COV-2 RNA RESP QL NAA+PROBE: NEGATIVE

## 2025-02-04 PROCEDURE — 87637 SARSCOV2&INF A&B&RSV AMP PRB: CPT | Performed by: STUDENT IN AN ORGANIZED HEALTH CARE EDUCATION/TRAINING PROGRAM

## 2025-02-04 PROCEDURE — 99283 EMERGENCY DEPT VISIT LOW MDM: CPT | Performed by: STUDENT IN AN ORGANIZED HEALTH CARE EDUCATION/TRAINING PROGRAM

## 2025-02-04 RX ORDER — OSELTAMIVIR PHOSPHATE 75 MG/1
75 CAPSULE ORAL 2 TIMES DAILY
Qty: 10 CAPSULE | Refills: 0 | Status: SHIPPED | OUTPATIENT
Start: 2025-02-04 | End: 2025-02-09

## 2025-02-04 ASSESSMENT — COLUMBIA-SUICIDE SEVERITY RATING SCALE - C-SSRS
2. HAVE YOU ACTUALLY HAD ANY THOUGHTS OF KILLING YOURSELF IN THE PAST MONTH?: NO
1. IN THE PAST MONTH, HAVE YOU WISHED YOU WERE DEAD OR WISHED YOU COULD GO TO SLEEP AND NOT WAKE UP?: NO
6. HAVE YOU EVER DONE ANYTHING, STARTED TO DO ANYTHING, OR PREPARED TO DO ANYTHING TO END YOUR LIFE?: NO

## 2025-02-04 ASSESSMENT — ACTIVITIES OF DAILY LIVING (ADL): ADLS_ACUITY_SCORE: 41

## 2025-02-04 NOTE — DISCHARGE INSTRUCTIONS
You were diagnosed with influenza A.  It is recommended that you get started on Tamiflu and I have prescribed this for you and you should start taking it this morning.  Otherwise, the treatment is supportive.  Get plenty of rest and drink lots of fluids.  Do not be afraid to take Tylenol as needed for fever or bodyaches.  Do not go to work until you are feeling better and fever free for at least 24 hours.  Return if you develop trouble breathing or any other new or concerning symptoms.

## 2025-02-04 NOTE — ED TRIAGE NOTES
Pt presents for eval of fever and body aches. 21 weeks pregnant.     Triage Assessment (Adult)       Row Name 02/04/25 0429          Triage Assessment    Airway WDL WDL        Respiratory WDL    Respiratory WDL X        Skin Circulation/Temperature WDL    Skin Circulation/Temperature WDL WDL        Cardiac WDL    Cardiac WDL WDL        Peripheral/Neurovascular WDL    Peripheral Neurovascular WDL WDL        Cognitive/Neuro/Behavioral WDL    Cognitive/Neuro/Behavioral WDL WDL

## 2025-02-04 NOTE — Clinical Note
Paula Yadav was seen and treated in our emergency department on 2/4/2025.  She may return to work on 02/10/2025.       If you have any questions or concerns, please don't hesitate to call.      Ezequiel Ku MD

## 2025-02-05 NOTE — ED PROVIDER NOTES
"  History     Chief Complaint   Patient presents with    Viral Syndrome     HPI  Paula Yadav is a 24 year old female who is 21 weeks pregnant, who presents to the emergency department for evaluation of fever and bodyaches.  Patient states symptoms started yesterday.  She has not taken any Tylenol or other medications.  She states she is a \"high risk pregnancy\" and is nervous to take any medications other than her prenatal and aspirin.  She denies cough or trouble breathing.  No nausea or vomiting.  She is eating and drinking well.  No abdominal pain.  No vaginal bleeding.  No gush of fluid.  No dysuria, urgency or frequency.  No diarrhea.  No rashes.  Sore throat.  No known ill contacts    Allergies:  Allergies   Allergen Reactions    Cortisone Shortness Of Breath     Following hip injection/pill form cortisone does fine with   Following hip injection      Keflex [Cephalexin]        Problem List:    There are no active problems to display for this patient.       Past Medical History:    Past Medical History:   Diagnosis Date    Anxiety        Past Surgical History:    No past surgical history on file.    Family History:    No family history on file.    Social History:  Marital Status:   [2]  Social History     Tobacco Use    Smoking status: Never    Smokeless tobacco: Never   Substance Use Topics    Alcohol use: No    Drug use: No        Medications:    oseltamivir (TAMIFLU) 75 MG capsule  benzonatate (TESSALON) 200 MG capsule  dexamethasone (DECADRON) 4 MG tablet  Escitalopram Oxalate (LEXAPRO PO)  guaiFENesin-codeine (ROBITUSSIN AC) 100-10 MG/5ML solution  levalbuterol (XOPENEX HFA) 45 MCG/ACT inhaler  lidocaine, viscous, (XYLOCAINE) 2 % solution  meclizine (ANTIVERT) 12.5 MG tablet  norethindrone-ethinyl estradiol (MICROGESTIN 1/20) 1-20 MG-MCG tablet          Review of Systems  See HPI  Physical Exam   BP: 119/74  Pulse: (!) 127  Temp: 99.6  F (37.6  C)  Resp: 18  SpO2: 99 %      Physical Exam  BP " 119/74   Pulse (!) 127   Temp 99.6  F (37.6  C) (Oral)   Resp 18   SpO2 97%   General: alert, interactive, in no apparent distress  Head: atraumatic  Nose: no rhinorrhea or epistaxis  Ears: no external auditory canal discharge or bleeding.    Eyes: Sclera nonicteric. Conjunctiva noninjected. PERRL, EOMI  Mouth: no tonsillar erythema, edema, or exudate.  Moist mucous membranes  Neck: supple, moving spontaneously no midline cervical tenderness  Lungs: No increased work of breathing.  Clear to auscultation bilaterally.  CV: Tachycardic, peripheral pulses palpable and symmetric  Abdomen: soft, uterus is nontender.  Extremities: Warm and well-perfused.   Skin: no rash or diaphoresis  Neuro: CN II-XII grossly intact, strength 5/5 in UE and LEs bilaterally, sensation intact to light touch in UE and LEs bilaterally;     ED Course        Procedures             Critical Care time:  none             Results for orders placed or performed during the hospital encounter of 02/04/25 (from the past 24 hours)   Influenza A/B, RSV and SARS-CoV2 PCR (COVID-19) Nasopharyngeal    Specimen: Nasopharyngeal; Swab   Result Value Ref Range    Influenza A PCR Positive (A) Negative    Influenza B PCR Negative Negative    RSV PCR Negative Negative    SARS CoV2 PCR Negative Negative    Narrative    Testing was performed using the Xpert Xpress CoV2/Flu/RSV Assay on the Cepheid GeneXpert Instrument. This test should be ordered for the detection of SARS-CoV2, influenza, and RSV viruses in individuals with signs and symptoms of respiratory tract infection. This test is for in vitro diagnostic use under the [place] FDA for laboratories certified under CLIA to perform high or moderate complexity testing. This test has been [place] FDA cleared. A negative result does not rule out the presence of PCR inhibitors in the specimen or target RNA in concentration below the limit of detection for the assay. If only one viral target is positive but coinfection with  multiple targets is suspected, the sample should be re-tested with another FDA cleared, approved, or authorized test, if coninfection would change clinical management. This test was validated by the [facility]. These laboratories are certified under the Clinical Laboratory Improvement Amendments of 1988 (CLIA-88) as qualified to perfom high complexity laboratory testing.       Medications - No data to display    Assessments & Plan (with Medical Decision Making)     I have reviewed the nursing notes.    I have reviewed the findings, diagnosis, plan and need for follow up with the patient.          Medical Decision Making  Paula Yadav is a 24 year old female who is 21 weeks pregnant, who presents to the emergency department for evaluation of fever and bodyaches.  Vital signs reviewed notable for sinus tachycardia, otherwise reassuring.  The patient is well-appearing on exam.  She is nontoxic.  She is not in any respiratory distress.  She has a reassuring cardiopulmonary exam.  No abdominal tenderness or evidence of  labor.  Patient tested positive for influenza A and I suspect this is the cause of her symptoms.  We discussed Tamiflu and per up-to-date, this is recommended for pregnant patients and I discussed this with her and prescribed a course of Tamiflu.  I also encouraged her to take Tylenol.  I explained to her that this is safe to take and will help her fever and help her feel better and I stressed that this can help the baby's well.  Patient is very concerned about these medications and I told her she could call her MFM tomorrow prior to starting them if she is concerned.  Outpatient management is appropriate.  Additional reassurance and anticipatory guidance discussed.  Return precautions discussed as well.        Discharge Medication List as of 2025  5:40 AM        START taking these medications    Details   oseltamivir (TAMIFLU) 75 MG capsule Take 1 capsule (75 mg) by mouth  2 times daily for 5 days., Disp-10 capsule, R-0, E-Prescribe             Final diagnoses:   Influenza A   Second trimester pregnancy       2/4/2025   [facility] EMERGENCY DEPT       Ezequiel Ku MD  02/04/25 [id number]

## 2025-03-05 ENCOUNTER — LAB REQUISITION (OUTPATIENT)
Dept: LAB | Facility: CLINIC | Age: 25
End: 2025-03-05
Payer: COMMERCIAL

## 2025-03-05 DIAGNOSIS — Z82.49 FAMILY HISTORY OF ISCHEMIC HEART DISEASE AND OTHER DISEASES OF THE CIRCULATORY SYSTEM: ICD-10-CM

## 2025-03-05 LAB
FACTOR 2 INTERPRETATION: NORMAL
HOLD SPECIMEN: NORMAL
LAB DIRECTOR COMMENTS: NORMAL
LAB DIRECTOR DISCLAIMER: NORMAL
LAB DIRECTOR INTERPRETATION: NORMAL
LAB DIRECTOR METHODOLOGY: NORMAL
LAB DIRECTOR RESULTS: NORMAL
LOCATION OF TASK: NORMAL
SPECIMEN TYPE: NORMAL

## 2025-03-05 PROCEDURE — 85306 CLOT INHIBIT PROT S FREE: CPT | Mod: ORL | Performed by: OBSTETRICS & GYNECOLOGY

## 2025-03-05 PROCEDURE — 85300 ANTITHROMBIN III ACTIVITY: CPT | Mod: ORL | Performed by: OBSTETRICS & GYNECOLOGY

## 2025-03-05 PROCEDURE — G0452 MOLECULAR PATHOLOGY INTERPR: HCPCS | Performed by: STUDENT IN AN ORGANIZED HEALTH CARE EDUCATION/TRAINING PROGRAM

## 2025-03-05 PROCEDURE — G0452 MOLECULAR PATHOLOGY INTERPR: HCPCS | Mod: 26 | Performed by: STUDENT IN AN ORGANIZED HEALTH CARE EDUCATION/TRAINING PROGRAM

## 2025-03-05 PROCEDURE — 85302 CLOT INHIBIT PROT C ANTIGEN: CPT | Performed by: OBSTETRICS & GYNECOLOGY

## 2025-03-05 PROCEDURE — 85306 CLOT INHIBIT PROT S FREE: CPT | Performed by: OBSTETRICS & GYNECOLOGY

## 2025-03-05 PROCEDURE — 81240 F2 GENE: CPT | Performed by: OBSTETRICS & GYNECOLOGY

## 2025-03-08 LAB — PROT C AG ACT/NOR PPP IA: >95 %

## 2025-03-10 LAB
AT III ACT/NOR PPP CHRO: 103 % (ref 85–135)
PROT S FREE AG ACT/NOR PPP IA: 40 % (ref 55–125)

## 2025-03-20 ENCOUNTER — LAB REQUISITION (OUTPATIENT)
Dept: LAB | Facility: CLINIC | Age: 25
End: 2025-03-20

## 2025-03-20 DIAGNOSIS — Z36.9 ENCOUNTER FOR ANTENATAL SCREENING, UNSPECIFIED: ICD-10-CM

## 2025-03-20 PROCEDURE — 86780 TREPONEMA PALLIDUM: CPT

## 2025-03-21 LAB — T PALLIDUM AB SER QL: NONREACTIVE

## (undated) RX ORDER — LIDOCAINE HYDROCHLORIDE 10 MG/ML
INJECTION, SOLUTION EPIDURAL; INFILTRATION; INTRACAUDAL; PERINEURAL
Status: DISPENSED
Start: 2018-11-20